# Patient Record
Sex: FEMALE | Race: BLACK OR AFRICAN AMERICAN | Employment: UNEMPLOYED | URBAN - METROPOLITAN AREA
[De-identification: names, ages, dates, MRNs, and addresses within clinical notes are randomized per-mention and may not be internally consistent; named-entity substitution may affect disease eponyms.]

---

## 2017-06-15 ENCOUNTER — APPOINTMENT (OUTPATIENT)
Dept: CT IMAGING | Age: 42
End: 2017-06-15
Attending: EMERGENCY MEDICINE
Payer: MEDICARE

## 2017-06-15 ENCOUNTER — APPOINTMENT (OUTPATIENT)
Dept: GENERAL RADIOLOGY | Age: 42
End: 2017-06-15
Attending: EMERGENCY MEDICINE
Payer: MEDICARE

## 2017-06-15 ENCOUNTER — HOSPITAL ENCOUNTER (EMERGENCY)
Age: 42
Discharge: HOME | End: 2017-06-15
Attending: EMERGENCY MEDICINE
Payer: MEDICARE

## 2017-06-15 VITALS
TEMPERATURE: 97.8 F | OXYGEN SATURATION: 99 % | WEIGHT: 293 LBS | BODY MASS INDEX: 51.91 KG/M2 | DIASTOLIC BLOOD PRESSURE: 72 MMHG | HEIGHT: 63 IN | HEART RATE: 83 BPM | RESPIRATION RATE: 18 BRPM | SYSTOLIC BLOOD PRESSURE: 125 MMHG

## 2017-06-15 DIAGNOSIS — R07.9 CHEST PAIN, UNSPECIFIED TYPE: Primary | ICD-10-CM

## 2017-06-15 LAB
ALBUMIN SERPL BCP-MCNC: 3.3 G/DL (ref 3.5–5)
ALBUMIN/GLOB SERPL: 0.8 {RATIO} (ref 1.2–3.5)
ALP SERPL-CCNC: 96 U/L (ref 50–136)
ALT SERPL-CCNC: 71 U/L (ref 12–65)
ANION GAP BLD CALC-SCNC: 6 MMOL/L (ref 7–16)
AST SERPL W P-5'-P-CCNC: 80 U/L (ref 15–37)
BILIRUB SERPL-MCNC: 0.4 MG/DL (ref 0.2–1.1)
BNP SERPL-MCNC: 4 PG/ML
BUN SERPL-MCNC: 8 MG/DL (ref 6–23)
CALCIUM SERPL-MCNC: 8.8 MG/DL (ref 8.3–10.4)
CHLORIDE SERPL-SCNC: 106 MMOL/L (ref 98–107)
CO2 SERPL-SCNC: 29 MMOL/L (ref 21–32)
CREAT SERPL-MCNC: 0.82 MG/DL (ref 0.6–1)
D DIMER PPP FEU-MCNC: 1.9 UG/ML(FEU)
DIFFERENTIAL METHOD BLD: ABNORMAL
ERYTHROCYTE [DISTWIDTH] IN BLOOD BY AUTOMATED COUNT: 14 % (ref 11.9–14.6)
GLOBULIN SER CALC-MCNC: 4.2 G/DL (ref 2.3–3.5)
GLUCOSE SERPL-MCNC: 83 MG/DL (ref 65–100)
HCT VFR BLD AUTO: 37.9 % (ref 35.8–46.3)
HGB BLD-MCNC: 12.5 G/DL (ref 11.7–15.4)
LIPASE SERPL-CCNC: 219 U/L (ref 73–393)
MCH RBC QN AUTO: 28.7 PG (ref 26.1–32.9)
MCHC RBC AUTO-ENTMCNC: 33 G/DL (ref 31.4–35)
MCV RBC AUTO: 87.1 FL (ref 79.6–97.8)
PLATELET # BLD AUTO: 192 K/UL (ref 150–450)
PLATELET COMMENTS,PCOM: ADEQUATE
PMV BLD AUTO: 10.7 FL (ref 10.8–14.1)
POTASSIUM SERPL-SCNC: 3.9 MMOL/L (ref 3.5–5.1)
PROT SERPL-MCNC: 7.5 G/DL (ref 6.3–8.2)
RBC # BLD AUTO: 4.35 M/UL (ref 4.05–5.25)
RBC MORPH BLD: ABNORMAL
SODIUM SERPL-SCNC: 141 MMOL/L (ref 136–145)
TROPONIN I SERPL-MCNC: <0.04 NG/ML (ref 0.02–0.05)
TROPONIN I SERPL-MCNC: <0.04 NG/ML (ref 0.02–0.05)
WBC # BLD AUTO: 8.3 K/UL (ref 4.3–11.1)
WBC MORPH BLD: ABNORMAL

## 2017-06-15 PROCEDURE — 83690 ASSAY OF LIPASE: CPT | Performed by: EMERGENCY MEDICINE

## 2017-06-15 PROCEDURE — 74011636320 HC RX REV CODE- 636/320: Performed by: EMERGENCY MEDICINE

## 2017-06-15 PROCEDURE — 93005 ELECTROCARDIOGRAM TRACING: CPT | Performed by: EMERGENCY MEDICINE

## 2017-06-15 PROCEDURE — 83880 ASSAY OF NATRIURETIC PEPTIDE: CPT | Performed by: EMERGENCY MEDICINE

## 2017-06-15 PROCEDURE — 84484 ASSAY OF TROPONIN QUANT: CPT | Performed by: EMERGENCY MEDICINE

## 2017-06-15 PROCEDURE — 80053 COMPREHEN METABOLIC PANEL: CPT | Performed by: EMERGENCY MEDICINE

## 2017-06-15 PROCEDURE — 99283 EMERGENCY DEPT VISIT LOW MDM: CPT | Performed by: EMERGENCY MEDICINE

## 2017-06-15 PROCEDURE — 85025 COMPLETE CBC W/AUTO DIFF WBC: CPT | Performed by: EMERGENCY MEDICINE

## 2017-06-15 PROCEDURE — 71260 CT THORAX DX C+: CPT

## 2017-06-15 PROCEDURE — 71020 XR CHEST PA LAT: CPT

## 2017-06-15 PROCEDURE — 74011000258 HC RX REV CODE- 258: Performed by: EMERGENCY MEDICINE

## 2017-06-15 PROCEDURE — 85379 FIBRIN DEGRADATION QUANT: CPT | Performed by: EMERGENCY MEDICINE

## 2017-06-15 RX ORDER — CIPROFLOXACIN 500 MG/1
500 TABLET ORAL 2 TIMES DAILY
Qty: 14 TAB | Refills: 0 | Status: SHIPPED | OUTPATIENT
Start: 2017-06-15 | End: 2017-06-22

## 2017-06-15 RX ORDER — SODIUM CHLORIDE 0.9 % (FLUSH) 0.9 %
10 SYRINGE (ML) INJECTION
Status: COMPLETED | OUTPATIENT
Start: 2017-06-15 | End: 2017-06-15

## 2017-06-15 RX ADMIN — SODIUM CHLORIDE 100 ML: 900 INJECTION, SOLUTION INTRAVENOUS at 20:28

## 2017-06-15 RX ADMIN — Medication 10 ML: at 20:28

## 2017-06-15 RX ADMIN — IOPAMIDOL 100 ML: 755 INJECTION, SOLUTION INTRAVENOUS at 20:28

## 2017-06-16 LAB
ATRIAL RATE: 86 BPM
CALCULATED P AXIS, ECG09: 65 DEGREES
CALCULATED R AXIS, ECG10: 55 DEGREES
CALCULATED T AXIS, ECG11: 2 DEGREES
DIAGNOSIS, 93000: NORMAL
P-R INTERVAL, ECG05: 164 MS
Q-T INTERVAL, ECG07: 354 MS
QRS DURATION, ECG06: 76 MS
QTC CALCULATION (BEZET), ECG08: 423 MS
VENTRICULAR RATE, ECG03: 86 BPM

## 2017-06-16 NOTE — ED PROVIDER NOTES
HPI Comments: Patient is a 42 yo female with chest pain and fatigue. Patient states she has had intermittent pain for about the past 2 weeks and states she has had worsening fatigue during this time and nightsweats. States. Pain is located in the center of her chest, describes the pain as a pressure and last and is constant in nature. Denies any abdominal pain however does endorse some vomiting last night. States SOB with exertion. Patient is a 43 y.o. female presenting with chest pain. The history is provided by the patient. No  was used. Chest Pain (Angina)    Associated symptoms include shortness of breath. Pertinent negatives include no abdominal pain, no back pain, no cough, no fever, no headaches, no nausea, no vomiting and no weakness. Past Medical History:   Diagnosis Date    Hypertension     Psychiatric disorder     anxiety; depression       Past Surgical History:   Procedure Laterality Date    HX GI      gastric bypass    HX ORTHOPAEDIC      lt arm,lt leg,c3 fx         History reviewed. No pertinent family history. Social History     Social History    Marital status: SINGLE     Spouse name: N/A    Number of children: N/A    Years of education: N/A     Occupational History    Not on file. Social History Main Topics    Smoking status: Never Smoker    Smokeless tobacco: Never Used    Alcohol use Yes      Comment: occasional    Drug use: No    Sexual activity: No     Other Topics Concern    Not on file     Social History Narrative         ALLERGIES: Latex; Bactrim [sulfamethoprim]; Hydrocodone-acetaminophen; and Pcn [penicillins]    Review of Systems   Constitutional: Negative for chills and fever. HENT: Negative for rhinorrhea and sore throat. Eyes: Negative for visual disturbance. Respiratory: Positive for shortness of breath. Negative for cough. Cardiovascular: Positive for chest pain. Negative for leg swelling.    Gastrointestinal: Negative for abdominal pain, diarrhea, nausea and vomiting. Genitourinary: Negative for dysuria. Musculoskeletal: Negative for back pain and neck pain. Skin: Negative for rash. Neurological: Negative for weakness and headaches. Psychiatric/Behavioral: The patient is not nervous/anxious. Vitals:    06/15/17 1915   BP: 118/86   Pulse: 86   Resp: 16   Temp: 97.8 °F (36.6 °C)   SpO2: 98%   Weight: (!) 173.7 kg (383 lb)   Height: 5' 3\" (1.6 m)            Physical Exam   Constitutional: She is oriented to person, place, and time. She appears well-developed and well-nourished. HENT:   Head: Normocephalic. Right Ear: External ear normal.   Left Ear: External ear normal.   Eyes: Conjunctivae and EOM are normal. Pupils are equal, round, and reactive to light. Neck: Normal range of motion. Neck supple. No tracheal deviation present. Cardiovascular: Normal rate, regular rhythm, normal heart sounds and intact distal pulses. No murmur heard. Pulmonary/Chest: Effort normal and breath sounds normal. No respiratory distress. Abdominal: Soft. She exhibits no distension. There is no tenderness. There is no rebound. Musculoskeletal: Normal range of motion. Neurological: She is alert and oriented to person, place, and time. No cranial nerve deficit. Skin: No rash noted. Nursing note and vitals reviewed.        MDM  Number of Diagnoses or Management Options  Chest pain, unspecified type: new and requires workup     Amount and/or Complexity of Data Reviewed  Clinical lab tests: ordered and reviewed  Tests in the radiology section of CPT®: ordered and reviewed  Tests in the medicine section of CPT®: ordered and reviewed  Review and summarize past medical records: yes    Risk of Complications, Morbidity, and/or Mortality  Presenting problems: high  Diagnostic procedures: high  Management options: high    Patient Progress  Patient progress: stable    ED Course       Procedures    Recent Results (from the past 12 hour(s))   EKG, 12 LEAD, INITIAL    Collection Time: 06/15/17  7:04 PM   Result Value Ref Range    Ventricular Rate 86 BPM    Atrial Rate 86 BPM    P-R Interval 164 ms    QRS Duration 76 ms    Q-T Interval 354 ms    QTC Calculation (Bezet) 423 ms    Calculated P Axis 65 degrees    Calculated R Axis 55 degrees    Calculated T Axis 2 degrees    Diagnosis       Normal sinus rhythm  Normal ECG  No previous ECGs available     CBC WITH AUTOMATED DIFF    Collection Time: 06/15/17  7:14 PM   Result Value Ref Range    WBC 8.3 4.3 - 11.1 K/uL    RBC 4.35 4.05 - 5.25 M/uL    HGB 12.5 11.7 - 15.4 g/dL    HCT 37.9 35.8 - 46.3 %    MCV 87.1 79.6 - 97.8 FL    MCH 28.7 26.1 - 32.9 PG    MCHC 33.0 31.4 - 35.0 g/dL    RDW 14.0 11.9 - 14.6 %    PLATELET 478 789 - 488 K/uL    MPV 10.7 (L) 10.8 - 14.1 FL    RBC COMMENTS NORMOCYTIC/NORMOCHROMIC      WBC COMMENTS Result Confirmed By Smear      PLATELET COMMENTS ADEQUATE      DF MANUAL     METABOLIC PANEL, COMPREHENSIVE    Collection Time: 06/15/17  7:14 PM   Result Value Ref Range    Sodium 141 136 - 145 mmol/L    Potassium 3.9 3.5 - 5.1 mmol/L    Chloride 106 98 - 107 mmol/L    CO2 29 21 - 32 mmol/L    Anion gap 6 (L) 7 - 16 mmol/L    Glucose 83 65 - 100 mg/dL    BUN 8 6 - 23 MG/DL    Creatinine 0.82 0.6 - 1.0 MG/DL    GFR est AA >60 >60 ml/min/1.73m2    GFR est non-AA >60 >60 ml/min/1.73m2    Calcium 8.8 8.3 - 10.4 MG/DL    Bilirubin, total 0.4 0.2 - 1.1 MG/DL    ALT (SGPT) 71 (H) 12 - 65 U/L    AST (SGOT) 80 (H) 15 - 37 U/L    Alk.  phosphatase 96 50 - 136 U/L    Protein, total 7.5 6.3 - 8.2 g/dL    Albumin 3.3 (L) 3.5 - 5.0 g/dL    Globulin 4.2 (H) 2.3 - 3.5 g/dL    A-G Ratio 0.8 (L) 1.2 - 3.5     TROPONIN I    Collection Time: 06/15/17  7:14 PM   Result Value Ref Range    Troponin-I, Qt. <0.04 0.02 - 0.05 NG/ML   LIPASE    Collection Time: 06/15/17  7:14 PM   Result Value Ref Range    Lipase 219 73 - 393 U/L     Xr Chest Pa Lat    Result Date: 6/15/2017  PA and lateral chest radiographs History: chest pain, 42 years Female moderate cp with shob x 2 weeks Comparison: None available Findings:  Normal cardiomediastinal silhouette. Nonspecific mild diffuse interstitial prominence, however in the appropriate clinical setting an acute atypical pneumonia such as mycoplasma or influenza could be considered. Mild dependent subsegmental atelectasis bilateral lung bases. No evidence of pneumothorax, pleural effusion, or air space opacity. Visualized soft tissue and osseous structures otherwise unremarkable. Impression:  Nonspecific mild diffuse interstitial prominence, which in the appropriate clinical setting could represent an acute atypical pneumonia. Xr Chest Pa Lat    Result Date: 6/15/2017  PA and lateral chest radiographs History: chest pain, 42 years Female moderate cp with shob x 2 weeks Comparison: None available Findings:  Normal cardiomediastinal silhouette. Nonspecific mild diffuse interstitial prominence, however in the appropriate clinical setting an acute atypical pneumonia such as mycoplasma or influenza could be considered. Mild dependent subsegmental atelectasis bilateral lung bases. No evidence of pneumothorax, pleural effusion, or air space opacity. Visualized soft tissue and osseous structures otherwise unremarkable. Impression:  Nonspecific mild diffuse interstitial prominence, which in the appropriate clinical setting could represent an acute atypical pneumonia. Ct Chest W Cont    Result Date: 6/15/2017  CT THORAX WITH CONTRAST HISTORY: PE protocol- also central chest mass w/ SOB, 42 years Female chest pain started a few weeks ago, progressively worsening, hot flashes, dizzy, shortness of breath COMPARISON: None available TECHNIQUE: 100 cc of nonionic contrast injected, and axial helical images from the thoracic inlet through diaphragm were obtained.   Radiation dose reduction techniques were used for this study:  Our CT scanners use one or all of the following: Automated exposure control, adjustment of the mA and/or kVp according to patient's size, iterative reconstruction. FINDINGS: Partially visualized thyroid unremarkable. No evidence of significant mediastinal, hilar, or axillary lymphadenopathy. Mild calcific atherosclerosis of a normal caliber aortic arch and descending aorta. No evidence of filling defect in the pulmonary artery or its lobar or segmental branches. No evidence of pulmonary embolus. Mild dependent subsegmental atelectasis bilateral lung bases. No evidence of pleural effusion or air space consolidation. Visualized proximal airways unremarkable. Evidence of gastric bypass surgery. Right renal upper pole cortical scarring. Visualized upper abdominal viscera including the adrenal glands are otherwise unremarkable. Visualized osseous structures unremarkable. IMPRESSION: No acute pathology identified. No evidence of pulmonary embolus. Other incidental findings as above. Patient is a 44 yo female with chest pain and SOB:    Patient very well appearing, smiling, NAD. Delta troponin negative, EKG without concern for acurte process, discussed with Dr. Destin Davis who agrees with plan for immediate follow up and cardiology overnight follow up line called and he states he will notify scheduling as well. Patient agrees to return with any further chest pain, any SOB or any further concerns. Imaging as above with only possible explanation as atypical pneumonia. Patient placed on cipro for possible pneumonia and will call first thing tomorrow for appointment tomorrow or first thing Monday.

## 2018-01-14 LAB
FLUAV AG NPH QL IA: NEGATIVE
FLUBV AG NPH QL IA: NEGATIVE

## 2018-01-14 PROCEDURE — 87804 INFLUENZA ASSAY W/OPTIC: CPT | Performed by: EMERGENCY MEDICINE

## 2018-01-14 PROCEDURE — 99282 EMERGENCY DEPT VISIT SF MDM: CPT | Performed by: EMERGENCY MEDICINE

## 2018-01-15 ENCOUNTER — HOSPITAL ENCOUNTER (EMERGENCY)
Age: 43
Discharge: HOME | End: 2018-01-15
Attending: EMERGENCY MEDICINE
Payer: MEDICARE

## 2018-01-15 VITALS
SYSTOLIC BLOOD PRESSURE: 164 MMHG | TEMPERATURE: 98.9 F | BODY MASS INDEX: 51.91 KG/M2 | OXYGEN SATURATION: 98 % | RESPIRATION RATE: 20 BRPM | HEIGHT: 63 IN | WEIGHT: 293 LBS | HEART RATE: 83 BPM | DIASTOLIC BLOOD PRESSURE: 94 MMHG

## 2018-01-15 DIAGNOSIS — J06.9 ACUTE UPPER RESPIRATORY INFECTION: Primary | ICD-10-CM

## 2018-01-15 RX ORDER — BENZONATATE 100 MG/1
200 CAPSULE ORAL
Qty: 30 CAP | Refills: 1 | Status: SHIPPED | OUTPATIENT
Start: 2018-01-15 | End: 2018-01-22

## 2018-01-15 NOTE — DISCHARGE INSTRUCTIONS
Upper Respiratory Infection (Cold): Care Instructions  Your Care Instructions    An upper respiratory infection, or URI, is an infection of the nose, sinuses, or throat. URIs are spread by coughs, sneezes, and direct contact. The common cold is the most frequent kind of URI. The flu and sinus infections are other kinds of URIs. Almost all URIs are caused by viruses. Antibiotics won't cure them. But you can treat most infections with home care. This may include drinking lots of fluids and taking over-the-counter pain medicine. You will probably feel better in 4 to 10 days. The doctor has checked you carefully, but problems can develop later. If you notice any problems or new symptoms, get medical treatment right away. Follow-up care is a key part of your treatment and safety. Be sure to make and go to all appointments, and call your doctor if you are having problems. It's also a good idea to know your test results and keep a list of the medicines you take. How can you care for yourself at home? · To prevent dehydration, drink plenty of fluids, enough so that your urine is light yellow or clear like water. Choose water and other caffeine-free clear liquids until you feel better. If you have kidney, heart, or liver disease and have to limit fluids, talk with your doctor before you increase the amount of fluids you drink. · Take an over-the-counter pain medicine, such as acetaminophen (Tylenol), ibuprofen (Advil, Motrin), or naproxen (Aleve). Read and follow all instructions on the label. · Before you use cough and cold medicines, check the label. These medicines may not be safe for young children or for people with certain health problems. · Be careful when taking over-the-counter cold or flu medicines and Tylenol at the same time. Many of these medicines have acetaminophen, which is Tylenol. Read the labels to make sure that you are not taking more than the recommended dose.  Too much acetaminophen (Tylenol) can be harmful. · Get plenty of rest.  · Do not smoke or allow others to smoke around you. If you need help quitting, talk to your doctor about stop-smoking programs and medicines. These can increase your chances of quitting for good. When should you call for help? Call 911 anytime you think you may need emergency care. For example, call if:  ? · You have severe trouble breathing. ?Call your doctor now or seek immediate medical care if:  ? · You seem to be getting much sicker. ? · You have new or worse trouble breathing. ? · You have a new or higher fever. ? · You have a new rash. ? Watch closely for changes in your health, and be sure to contact your doctor if:  ? · You have a new symptom, such as a sore throat, an earache, or sinus pain. ? · You cough more deeply or more often, especially if you notice more mucus or a change in the color of your mucus. ? · You do not get better as expected. Where can you learn more? Go to http://naz-shelly.info/. Enter K465 in the search box to learn more about \"Upper Respiratory Infection (Cold): Care Instructions. \"  Current as of: May 12, 2017  Content Version: 11.4  © 6544-7841 [employer]. Care instructions adapted under license by [employer] (which disclaims liability or warranty for this information). If you have questions about a medical condition or this instruction, always ask your healthcare professional. [place] [address] any warranty or liability for your use of this information.

## 2018-01-15 NOTE — ED NOTES
I have reviewed discharge instructions with the patient. The patient verbalized understanding. Patient left ED via Discharge Method: ambulatory to Home). Opportunity for questions and clarification provided. Patient given 2 scripts. To continue your aftercare when you leave the hospital, you may receive an automated call from our care team to check in on how you are doing. This is a free service and part of our promise to provide the best care and service to meet your aftercare needs.  If you have questions, or wish to unsubscribe from this service please call [phone]. Thank you for Choosing our [facility].

## 2018-01-15 NOTE — ED PROVIDER NOTES
HPI Comments: Patient states she has been having severe body aches and sinus congestion and a cough for the past 2 days. Her symptoms have gotten progressively worse. She also has been having a sore throat. She has had a mild low-grade fever, denies any vomiting or diarrhea. She has been eating and drinking well without any difficulty. She denies any known sick contacts, has not taken any medicine for her symptoms. Elements of this note were created using speech recognition software. As such, errors of speech recognition may be present. Patient is a 43 y.o. female presenting with cough and fever. The history is provided by the patient. Cough   Associated symptoms include chills. Pertinent negatives include no nausea and no vomiting. Fever    Associated symptoms include cough. Pertinent negatives include no diarrhea and no vomiting. Past Medical History:   Diagnosis Date    Diabetes ([facility] [address]     Hypertension     Psychiatric disorder     anxiety; depression       Past Surgical History:   Procedure Laterality Date    HX GI      gastric bypass    HX ORTHOPAEDIC      lt arm,lt leg,c3 fx         No family history on file. Social History     Social History    Marital status: SINGLE     Spouse name: N/A    Number of children: N/A    Years of education: N/A     Occupational History    Not on file. Social History Main Topics    Smoking status: Never Smoker    Smokeless tobacco: Never Used    Alcohol use Yes      Comment: occasional    Drug use: No    Sexual activity: No     Other Topics Concern    Not on file     Social History Narrative         ALLERGIES: Latex; Bactrim [sulfamethoprim]; Hydrocodone-acetaminophen; and Pcn [penicillins]    Review of Systems   Constitutional: Positive for chills and fever. Respiratory: Positive for cough. Gastrointestinal: Negative for diarrhea, nausea and vomiting. All other systems reviewed and are negative.       Vitals:    01/14/18 2208 01/15/18 0050   BP: (!) 164/94    Pulse: 83    Resp: 20    Temp: 98.9 °F (37.2 °C)    SpO2: 99% 98%   Weight: (!) 171 kg (377 lb)    Height: 5' 3\" (1.6 m)             Physical Exam   Constitutional: She is oriented to person, place, and time. She appears well-developed and well-nourished. HENT:   Head: Normocephalic and atraumatic. Eyes: Conjunctivae are normal. Pupils are equal, round, and reactive to light. Neck: Normal range of motion. Neck supple. Cardiovascular: Normal rate and regular rhythm. Pulmonary/Chest: Effort normal and breath sounds normal.   Musculoskeletal: She exhibits no edema or tenderness. Neurological: She is alert and oriented to person, place, and time. Skin: Skin is warm and dry. Psychiatric: She has a normal mood and affect. Her behavior is normal.   Nursing note and vitals reviewed.        MDM  Number of Diagnoses or Management Options  Acute upper respiratory infection: new and does not require workup  Diagnosis management comments: differential diagnosis: vital syndrome, strep throat, influenza, pneumonia  1:10 AM discussed results with patient, need for symptomatic relief       Amount and/or Complexity of Data Reviewed  Clinical lab tests: ordered and reviewed    Risk of Complications, Morbidity, and/or Mortality  Presenting problems: moderate  Diagnostic procedures: moderate  Management options: moderate    Patient Progress  Patient progress: stable    ED Course       Procedures

## 2018-05-07 ENCOUNTER — HOSPITAL ENCOUNTER (EMERGENCY)
Age: 43
Discharge: HOME | End: 2018-05-07
Attending: EMERGENCY MEDICINE
Payer: MEDICARE

## 2018-05-07 VITALS
HEART RATE: 73 BPM | RESPIRATION RATE: 18 BRPM | OXYGEN SATURATION: 99 % | DIASTOLIC BLOOD PRESSURE: 60 MMHG | TEMPERATURE: 98.5 F | SYSTOLIC BLOOD PRESSURE: 121 MMHG

## 2018-05-07 DIAGNOSIS — H92.01 OTALGIA OF RIGHT EAR: ICD-10-CM

## 2018-05-07 DIAGNOSIS — R30.0 DYSURIA: Primary | ICD-10-CM

## 2018-05-07 LAB
BACTERIA URNS QL MICRO: 0 /HPF
CASTS URNS QL MICRO: NORMAL /LPF
EPI CELLS #/AREA URNS HPF: NORMAL /HPF
HCG UR QL: NEGATIVE
RBC #/AREA URNS HPF: NORMAL /HPF
WBC URNS QL MICRO: NORMAL /HPF

## 2018-05-07 PROCEDURE — 81025 URINE PREGNANCY TEST: CPT

## 2018-05-07 PROCEDURE — 99284 EMERGENCY DEPT VISIT MOD MDM: CPT | Performed by: EMERGENCY MEDICINE

## 2018-05-07 PROCEDURE — 74011250637 HC RX REV CODE- 250/637: Performed by: EMERGENCY MEDICINE

## 2018-05-07 PROCEDURE — 81003 URINALYSIS AUTO W/O SCOPE: CPT | Performed by: EMERGENCY MEDICINE

## 2018-05-07 PROCEDURE — 81015 MICROSCOPIC EXAM OF URINE: CPT | Performed by: EMERGENCY MEDICINE

## 2018-05-07 RX ORDER — TRAZODONE HYDROCHLORIDE 50 MG/1
100 TABLET ORAL
COMMUNITY

## 2018-05-07 RX ORDER — UREA 10 %
800 LOTION (ML) TOPICAL DAILY
COMMUNITY

## 2018-05-07 RX ORDER — VENLAFAXINE HYDROCHLORIDE 75 MG/1
225 CAPSULE, EXTENDED RELEASE ORAL DAILY
COMMUNITY
End: 2019-09-25 | Stop reason: CLARIF

## 2018-05-07 RX ORDER — PHENAZOPYRIDINE HYDROCHLORIDE 95 MG/1
200 TABLET ORAL
Status: COMPLETED | OUTPATIENT
Start: 2018-05-07 | End: 2018-05-07

## 2018-05-07 RX ORDER — PREDNISOLONE ACETATE 10 MG/ML
1 SUSPENSION/ DROPS OPHTHALMIC 2 TIMES DAILY
COMMUNITY
End: 2018-06-01 | Stop reason: CLARIF

## 2018-05-07 RX ORDER — NITROFURANTOIN 25; 75 MG/1; MG/1
100 CAPSULE ORAL 2 TIMES DAILY
Qty: 14 CAP | Refills: 0 | Status: SHIPPED | OUTPATIENT
Start: 2018-05-07 | End: 2018-05-14

## 2018-05-07 RX ORDER — NITROFURANTOIN MACROCRYSTALS 50 MG/1
100 CAPSULE ORAL
Status: COMPLETED | OUTPATIENT
Start: 2018-05-07 | End: 2018-05-07

## 2018-05-07 RX ORDER — PHENAZOPYRIDINE HYDROCHLORIDE 200 MG/1
200 TABLET, FILM COATED ORAL 3 TIMES DAILY
Qty: 6 TAB | Refills: 0 | Status: SHIPPED | OUTPATIENT
Start: 2018-05-07 | End: 2018-05-09

## 2018-05-07 RX ORDER — VENLAFAXINE HYDROCHLORIDE 150 MG/1
225 CAPSULE, EXTENDED RELEASE ORAL DAILY
COMMUNITY
End: 2019-09-25 | Stop reason: CLARIF

## 2018-05-07 RX ADMIN — NITROFURANTOIN MACROCRYSTALS 100 MG: 50 CAPSULE ORAL at 23:08

## 2018-05-07 RX ADMIN — URINARY PAIN RELIEF 190 MG: 95 TABLET ORAL at 23:07

## 2018-05-08 NOTE — ED PROVIDER NOTES
HPI Comments: Here with some right ear pain x several days. No fever. No injury. Some pain just before voiding/ during and tapers after. no change in urine odor or frequency. Hand and thumb issue is long-standing and followed by ortho and essentially unchanged    Patient is a 43 y.o. female presenting with ear pain and urinary pain. The history is provided by the patient. Ear Pain    This is a new problem. The current episode started more than 2 days ago. Patient complains that the right ear is affected. There has been no fever. The pain is mild. Associated symptoms include headaches. Pertinent negatives include no ear discharge, no hearing loss, no rhinorrhea, no sore throat, no diarrhea, no vomiting, no cough and no rash. Urinary Pain    Pertinent negatives include no chills and no vomiting. Past Medical History:   Diagnosis Date    Diabetes ([address]     Hypertension     Psychiatric disorder     anxiety; depression       Past Surgical History:   Procedure Laterality Date    HX GI      gastric bypass    HX ORTHOPAEDIC      lt arm,lt leg,c3 fx         History reviewed. No pertinent family history. Social History     Social History    Marital status: SINGLE     Spouse name: N/A    Number of children: N/A    Years of education: N/A     Occupational History    Not on file. Social History Main Topics    Smoking status: Never Smoker    Smokeless tobacco: Never Used    Alcohol use 1.2 oz/week     1 Glasses of wine, 1 Cans of beer per week      Comment: occasional    Drug use: 1.00 per week     Special: Marijuana    Sexual activity: Yes     Birth control/ protection: None     Other Topics Concern    Not on file     Social History Narrative         ALLERGIES: Latex; Bactrim [sulfamethoprim]; Hydrocodone-acetaminophen; and Pcn [penicillins]    Review of Systems   Constitutional: Negative for chills. HENT: Positive for ear pain.  Negative for ear discharge, hearing loss, rhinorrhea and sore throat. Respiratory: Negative for cough. Gastrointestinal: Negative for diarrhea and vomiting. Skin: Negative for rash. Neurological: Positive for headaches. All other systems reviewed and are negative. Vitals:    05/07/18 1917 05/07/18 2304 05/07/18 2306 05/07/18 2314   BP: 148/74 121/60     Pulse: 79  73    Resp: 18      Temp: 97.9 °F (36.6 °C)   98.5 °F (36.9 °C)   SpO2: 100%  99%             Physical Exam   Constitutional: She appears well-developed and well-nourished. No distress. HENT:   Head: Atraumatic. Right Ear: Tympanic membrane, external ear and ear canal normal. No mastoid tenderness. Left Ear: Tympanic membrane, external ear and ear canal normal. No mastoid tenderness. Nose: Nose normal.   Eyes: No scleral icterus. Neck: Neck supple. Cardiovascular: Normal rate. Pulmonary/Chest: Effort normal. No respiratory distress. She has no wheezes. Abdominal: Soft. There is no tenderness. There is no rebound. Musculoskeletal: Normal range of motion. She exhibits no edema or deformity. Neurological: She is alert. She exhibits normal muscle tone. Coordination normal.   Skin: Skin is warm and dry. Psychiatric: Her behavior is normal. Thought content normal.   Nursing note and vitals reviewed. MDM  Number of Diagnoses or Management Options  Dysuria:   Otalgia of right ear:   Diagnosis management comments: Exam quite benign. Will cover possible though not defined lower UTI. No GYN issue.  Hand with no acute issue       Amount and/or Complexity of Data Reviewed  Clinical lab tests: reviewed and ordered  Decide to obtain previous medical records or to obtain history from someone other than the patient: yes    Risk of Complications, Morbidity, and/or Mortality  Presenting problems: moderate  Diagnostic procedures: minimal  Management options: low    Patient Progress  Patient progress: stable        ED Course       Procedures

## 2018-05-08 NOTE — DISCHARGE INSTRUCTIONS
Painful Urination (Dysuria): Care Instructions  Your Care Instructions  Burning pain with urination (dysuria) is a common symptom of a urinary tract infection or other urinary problems. The bladder may become inflamed. This can cause pain when the bladder fills and empties. You may also feel pain if the tube that carries urine from the bladder to the outside of the body (urethra) gets irritated or infected. Sexually transmitted infections (STIs) also may cause pain when you urinate. Sometimes the pain can be caused by things other than an infection. The urethra can be irritated by soaps, perfumes, or foreign objects in the urethra. Kidney stones can cause pain when they pass through the urethra. The cause may be hard to find. You may need tests. Treatment for painful urination depends on the cause. Follow-up care is a key part of your treatment and safety. Be sure to make and go to all appointments, and call your doctor if you are having problems. It's also a good idea to know your test results and keep a list of the medicines you take. How can you care for yourself at home? · Drink extra water for the next day or two. This will help make the urine less concentrated. (If you have kidney, heart, or liver disease and have to limit fluids, talk with your doctor before you increase the amount of fluids you drink.)  · Avoid drinks that are carbonated or have caffeine. They can irritate the bladder. · Urinate often. Try to empty your bladder each time. For women:  · Urinate right after you have sex. · After going to the bathroom, wipe from front to back. · Avoid douches, bubble baths, and feminine hygiene sprays. And avoid other feminine hygiene products that have deodorants. When should you call for help? Call your doctor now or seek immediate medical care if:  ? · You have new symptoms, such as fever, nausea, or vomiting. ? · You have new or worse symptoms of a urinary problem.  For example:  Collette Shine have blood or pus in your urine. ¨ You have chills or body aches. ¨ It hurts worse to urinate. ¨ You have groin or belly pain. ¨ You have pain in your back just below your rib cage (the flank area). ? Watch closely for changes in your health, and be sure to contact your doctor if you have any problems. Where can you learn more? Go to http://naz-shelly.info/. Enter M743 in the search box to learn more about \"Painful Urination (Dysuria): Care Instructions. \"  Current as of: May 12, 2017  Content Version: 11.4  © 7930-7512 [employer]. Care instructions adapted under license by [employer] (which disclaims liability or warranty for this information). If you have questions about a medical condition or this instruction, always ask your healthcare professional. [address] any warranty or liability for your use of this information. Earache: Care Instructions  Your Care Instructions    Even though infection is a common cause of ear pain, not all ear pain means an infection. If you have ear pain and don't have an infection, it could be because of a jaw problem, such as temporomandibular joint (TMJ) pain. Or it could be because of a neck problem. When ear discomfort or pain is mild or comes and goes without other symptoms, home treatment may be all you need. Follow-up care is a key part of your treatment and safety. Be sure to make and go to all appointments, and call your doctor if you are having problems. It's also a good idea to know your test results and keep a list of the medicines you take. How can you care for yourself at home? · Apply heat on the ear to ease pain. To apply heat, put a warm water bottle, a heating pad set on low, or a warm cloth on your ear. Do not go to sleep with a heating pad on your skin. · Take an over-the-counter pain medicine, such as acetaminophen (Tylenol), ibuprofen (Advil, Motrin), or naproxen (Aleve). Be safe with medicines. Read and follow all instructions on the label. · Do not take two or more pain medicines at the same time unless the doctor told you to. Many pain medicines have acetaminophen, which is Tylenol. Too much acetaminophen (Tylenol) can be harmful. · Never insert anything, such as a cotton swab or a camelia pin, into the ear. When should you call for help? Call your doctor now or seek immediate medical care if:  ? · You have new or worse symptoms of infection, such as:  ¨ Increased pain, swelling, warmth, or redness. ¨ Red streaks leading from the area. ¨ Pus draining from the area. ¨ A fever. ? Watch closely for changes in your health, and be sure to contact your doctor if:  ? · You have new or worse discharge coming from the ear. ? · You do not get better as expected. Where can you learn more? Go to http://naz-shelly.info/. Enter W423 in the search box to learn more about \"Earache: Care Instructions. \"  Current as of: May 12, 2017  Content Version: 11.4  © 4186-8206 [employer]. Care instructions adapted under license by [email or web address] (which disclaims liability or warranty for this information). If you have questions about a medical condition or this instruction, always ask your healthcare professional. [address] any warranty or liability for your use of this information.

## 2018-05-08 NOTE — ED NOTES
I have reviewed discharge instructions with the patient. The patient verbalized understanding. Patient left ED via Discharge Method: ambulatory to Home with (self). Opportunity for questions and clarification provided. Patient given 2 scripts. To continue your aftercare when you leave the hospital, you may receive an automated call from our care team to check in on how you are doing. This is a free service and part of our promise to provide the best care and service to meet your aftercare needs.  If you have questions, or wish to unsubscribe from this service please call [phone]. Thank you for Choosing our [address] [place] [address] Emergency Department.

## 2018-05-08 NOTE — ED NOTES
Pt states pain in right ear and headache on right side x4 days.  pts states pain to lower back and urinary frequency/burning, pt also states to right thumb

## 2018-05-14 ENCOUNTER — HOSPITAL ENCOUNTER (EMERGENCY)
Age: 43
Discharge: HOME | End: 2018-05-14
Attending: EMERGENCY MEDICINE
Payer: MEDICARE

## 2018-05-14 ENCOUNTER — APPOINTMENT (OUTPATIENT)
Dept: ULTRASOUND IMAGING | Age: 43
End: 2018-05-14
Attending: EMERGENCY MEDICINE
Payer: MEDICARE

## 2018-05-14 VITALS
RESPIRATION RATE: 16 BRPM | DIASTOLIC BLOOD PRESSURE: 79 MMHG | HEIGHT: 63 IN | WEIGHT: 293 LBS | TEMPERATURE: 98.1 F | SYSTOLIC BLOOD PRESSURE: 145 MMHG | HEART RATE: 80 BPM | OXYGEN SATURATION: 99 % | BODY MASS INDEX: 51.91 KG/M2

## 2018-05-14 DIAGNOSIS — N83.202 CYSTS OF BOTH OVARIES: Primary | ICD-10-CM

## 2018-05-14 DIAGNOSIS — N83.201 CYSTS OF BOTH OVARIES: Primary | ICD-10-CM

## 2018-05-14 LAB
ALBUMIN SERPL-MCNC: 2.8 G/DL (ref 3.5–5)
ALBUMIN/GLOB SERPL: 0.6 {RATIO} (ref 1.2–3.5)
ALP SERPL-CCNC: 83 U/L (ref 50–136)
ALT SERPL-CCNC: 21 U/L (ref 12–65)
ANION GAP SERPL CALC-SCNC: 9 MMOL/L (ref 7–16)
AST SERPL-CCNC: 17 U/L (ref 15–37)
BACTERIA URNS QL MICRO: 0 /HPF
BILIRUB SERPL-MCNC: 0.4 MG/DL (ref 0.2–1.1)
BUN SERPL-MCNC: 7 MG/DL (ref 6–23)
CALCIUM SERPL-MCNC: 8.3 MG/DL (ref 8.3–10.4)
CASTS URNS QL MICRO: NORMAL /LPF
CHLORIDE SERPL-SCNC: 106 MMOL/L (ref 98–107)
CO2 SERPL-SCNC: 27 MMOL/L (ref 21–32)
CREAT SERPL-MCNC: 0.91 MG/DL (ref 0.6–1)
DIFFERENTIAL METHOD BLD: ABNORMAL
EOSINOPHIL # BLD: 0.2 K/UL (ref 0–0.8)
EOSINOPHIL NFR BLD MANUAL: 1 % (ref 1–8)
EPI CELLS #/AREA URNS HPF: NORMAL /HPF
ERYTHROCYTE [DISTWIDTH] IN BLOOD BY AUTOMATED COUNT: 14.3 % (ref 11.9–14.6)
GLOBULIN SER CALC-MCNC: 4.4 G/DL (ref 2.3–3.5)
GLUCOSE SERPL-MCNC: 99 MG/DL (ref 65–100)
HCG UR QL: NEGATIVE
HCT VFR BLD AUTO: 34.2 % (ref 35.8–46.3)
HGB BLD-MCNC: 11.4 G/DL (ref 11.7–15.4)
LACTATE BLD-SCNC: 0.4 MMOL/L (ref 0.5–1.9)
LYMPHOCYTES # BLD: 3.2 K/UL (ref 0.5–4.6)
LYMPHOCYTES NFR BLD MANUAL: 20 % (ref 16–44)
MCH RBC QN AUTO: 28.9 PG (ref 26.1–32.9)
MCHC RBC AUTO-ENTMCNC: 33.3 G/DL (ref 31.4–35)
MCV RBC AUTO: 86.8 FL (ref 79.6–97.8)
MONOCYTES # BLD: 0.8 K/UL (ref 0.1–1.3)
MONOCYTES NFR BLD MANUAL: 5 % (ref 3–9)
NEUTS SEG # BLD: 11.8 K/UL (ref 1.7–8.2)
NEUTS SEG NFR BLD MANUAL: 74 % (ref 47–75)
PLATELET # BLD AUTO: 257 K/UL (ref 150–450)
PLATELET COMMENTS,PCOM: ADEQUATE
PMV BLD AUTO: 8.9 FL (ref 10.8–14.1)
POTASSIUM SERPL-SCNC: 2.9 MMOL/L (ref 3.5–5.1)
PROT SERPL-MCNC: 7.2 G/DL (ref 6.3–8.2)
RBC # BLD AUTO: 3.94 M/UL (ref 4.05–5.25)
RBC #/AREA URNS HPF: NORMAL /HPF
RBC MORPH BLD: ABNORMAL
SERVICE CMNT-IMP: NORMAL
SODIUM SERPL-SCNC: 142 MMOL/L (ref 136–145)
WBC # BLD AUTO: 16 K/UL (ref 4.3–11.1)
WBC MORPH BLD: ABNORMAL
WBC URNS QL MICRO: NORMAL /HPF
WET PREP GENITAL: NORMAL
WET PREP GENITAL: NORMAL

## 2018-05-14 PROCEDURE — 99285 EMERGENCY DEPT VISIT HI MDM: CPT | Performed by: EMERGENCY MEDICINE

## 2018-05-14 PROCEDURE — 96361 HYDRATE IV INFUSION ADD-ON: CPT | Performed by: EMERGENCY MEDICINE

## 2018-05-14 PROCEDURE — 76856 US EXAM PELVIC COMPLETE: CPT

## 2018-05-14 PROCEDURE — 81003 URINALYSIS AUTO W/O SCOPE: CPT | Performed by: EMERGENCY MEDICINE

## 2018-05-14 PROCEDURE — 87491 CHLMYD TRACH DNA AMP PROBE: CPT | Performed by: EMERGENCY MEDICINE

## 2018-05-14 PROCEDURE — 83605 ASSAY OF LACTIC ACID: CPT

## 2018-05-14 PROCEDURE — 96374 THER/PROPH/DIAG INJ IV PUSH: CPT | Performed by: EMERGENCY MEDICINE

## 2018-05-14 PROCEDURE — 80053 COMPREHEN METABOLIC PANEL: CPT | Performed by: EMERGENCY MEDICINE

## 2018-05-14 PROCEDURE — 81025 URINE PREGNANCY TEST: CPT

## 2018-05-14 PROCEDURE — 81015 MICROSCOPIC EXAM OF URINE: CPT | Performed by: EMERGENCY MEDICINE

## 2018-05-14 PROCEDURE — 74011250636 HC RX REV CODE- 250/636: Performed by: EMERGENCY MEDICINE

## 2018-05-14 PROCEDURE — 87210 SMEAR WET MOUNT SALINE/INK: CPT | Performed by: EMERGENCY MEDICINE

## 2018-05-14 PROCEDURE — 85025 COMPLETE CBC W/AUTO DIFF WBC: CPT | Performed by: EMERGENCY MEDICINE

## 2018-05-14 RX ORDER — SODIUM CHLORIDE 0.9 % (FLUSH) 0.9 %
5-10 SYRINGE (ML) INJECTION AS NEEDED
Status: DISCONTINUED | OUTPATIENT
Start: 2018-05-14 | End: 2018-05-15 | Stop reason: HOSPADM

## 2018-05-14 RX ORDER — KETOROLAC TROMETHAMINE 30 MG/ML
30 INJECTION, SOLUTION INTRAMUSCULAR; INTRAVENOUS
Status: COMPLETED | OUTPATIENT
Start: 2018-05-14 | End: 2018-05-14

## 2018-05-14 RX ORDER — TRAMADOL HYDROCHLORIDE 50 MG/1
50 TABLET ORAL
Qty: 20 TAB | Refills: 0 | Status: SHIPPED | OUTPATIENT
Start: 2018-05-14

## 2018-05-14 RX ORDER — ONDANSETRON 2 MG/ML
4 INJECTION INTRAMUSCULAR; INTRAVENOUS
Status: DISCONTINUED | OUTPATIENT
Start: 2018-05-14 | End: 2018-05-15 | Stop reason: HOSPADM

## 2018-05-14 RX ADMIN — KETOROLAC TROMETHAMINE 30 MG: 30 INJECTION, SOLUTION INTRAMUSCULAR at 20:30

## 2018-05-14 RX ADMIN — SODIUM CHLORIDE 1000 ML: 900 INJECTION, SOLUTION INTRAVENOUS at 20:20

## 2018-05-14 NOTE — ED TRIAGE NOTES
PMD-None. Pt c/o urinary and back pain and nausea continuing since being seen here on 5/7/18. She states that she started the antibiotics but within a few days her discomfort was worse.

## 2018-05-15 NOTE — ED PROVIDER NOTES
HPI Comments: Patient with a history of gastric bypass. Was seen here one week ago with ear pain and dysuria. Diagnosed with possible UTI and started on Macrobid. Patient some subtle some small improvement in pain but then worsened again a couple days ago. Has lower abdominal pain achy sharp in nature. Mild nausea. No vomiting. No diarrhea or constipation. Denies any vaginal bleeding or discharge. Patient is a 43 y.o. female presenting with abdominal pain. The history is provided by the patient. No  was used. Abdominal Pain    This is a new problem. The current episode started more than 1 week ago. The problem occurs constantly. The problem has been gradually worsening. The pain is associated with an unknown factor. The pain is located in the suprapubic region. The quality of the pain is aching. The pain is moderate. Associated symptoms include nausea and dysuria. Pertinent negatives include no fever, no diarrhea, no melena, no vomiting, no constipation, no hematuria, no headaches, no chest pain and no back pain. The pain is worsened by palpation. The pain is relieved by nothing. Her past medical history is significant for DM. Past Medical History:   Diagnosis Date    Diabetes ([facility] [address]     Hypertension     Psychiatric disorder     anxiety; depression       Past Surgical History:   Procedure Laterality Date    HX GI      gastric bypass    HX ORTHOPAEDIC      lt arm,lt leg,c3 fx         History reviewed. No pertinent family history. Social History     Social History    Marital status: SINGLE     Spouse name: N/A    Number of children: N/A    Years of education: N/A     Occupational History    Not on file.      Social History Main Topics    Smoking status: Never Smoker    Smokeless tobacco: Never Used    Alcohol use 1.2 oz/week     1 Glasses of wine, 1 Cans of beer per week      Comment: occasional    Drug use: 1.00 per week     Special: Marijuana    Sexual activity: Yes     Birth control/ protection: None     Other Topics Concern    Not on file     Social History Narrative         ALLERGIES: Latex; Adhesive; Bactrim [sulfamethoprim]; Hydrocodone-acetaminophen; and Pcn [penicillins]    Review of Systems   Constitutional: Negative for chills and fever. HENT: Negative for rhinorrhea and sore throat. Eyes: Negative for pain and redness. Respiratory: Negative for chest tightness, shortness of breath and wheezing. Cardiovascular: Negative for chest pain and leg swelling. Gastrointestinal: Positive for abdominal pain and nausea. Negative for constipation, diarrhea, melena and vomiting. Genitourinary: Positive for dysuria. Negative for hematuria, vaginal bleeding and vaginal discharge. Musculoskeletal: Negative for back pain, gait problem, neck pain and neck stiffness. Skin: Negative for color change and rash. Neurological: Negative for weakness, numbness and headaches. Vitals:    05/14/18 1844   BP: 146/84   Pulse: 91   Resp: 18   Temp: 98.2 °F (36.8 °C)   SpO2: 98%   Weight: (!) 170.1 kg (375 lb)   Height: 5' 3\" (1.6 m)            Physical Exam   Constitutional: She is oriented to person, place, and time. She appears well-developed and well-nourished. HENT:   Head: Normocephalic and atraumatic. Neck: Normal range of motion. Neck supple. Cardiovascular: Normal rate and regular rhythm. No murmur heard. Pulmonary/Chest: Effort normal and breath sounds normal. She has no wheezes. Abdominal: Soft. Bowel sounds are normal. There is tenderness (lower abd). Genitourinary: Cervix exhibits no motion tenderness, no discharge and no friability. Right adnexum displays no mass and no tenderness. Left adnexum displays no mass and no tenderness. No tenderness or bleeding in the vagina. No foreign body in the vagina. Vaginal discharge found. Musculoskeletal: Normal range of motion. She exhibits no edema.    Neurological: She is alert and oriented to person, place, and time. Skin: Skin is warm and dry. Nursing note and vitals reviewed. MDM  Number of Diagnoses or Management Options  Diagnosis management comments: Ovarian cyst. Will discharge with follow up. Amount and/or Complexity of Data Reviewed  Clinical lab tests: ordered and reviewed  Tests in the medicine section of CPT®: ordered and reviewed    Patient Progress  Patient progress: stable        ED Course       Procedures      Results Include:    Recent Results (from the past 24 hour(s))   CBC WITH AUTOMATED DIFF    Collection Time: 05/14/18  7:04 PM   Result Value Ref Range    WBC 16.0 (H) 4.3 - 11.1 K/uL    RBC 3.94 (L) 4.05 - 5.25 M/uL    HGB 11.4 (L) 11.7 - 15.4 g/dL    HCT 34.2 (L) 35.8 - 46.3 %    MCV 86.8 79.6 - 97.8 FL    MCH 28.9 26.1 - 32.9 PG    MCHC 33.3 31.4 - 35.0 g/dL    RDW 14.3 11.9 - 14.6 %    PLATELET 557 700 - 195 K/uL    MPV 8.9 (L) 10.8 - 14.1 FL    NEUTROPHILS 74 47 - 75 %    LYMPHOCYTES 20 16 - 44 %    MONOCYTES 5 3 - 9 %    EOSINOPHILS 1 1 - 8 %    ABS. NEUTROPHILS 11.8 (H) 1.7 - 8.2 K/UL    ABS. LYMPHOCYTES 3.2 0.5 - 4.6 K/UL    ABS. MONOCYTES 0.8 0.1 - 1.3 K/UL    ABS. EOSINOPHILS 0.2 0.0 - 0.8 K/UL    RBC COMMENTS SLIGHT  POLYCHROMASIA        WBC COMMENTS OCCASIONAL      PLATELET COMMENTS ADEQUATE      DF MANUAL     METABOLIC PANEL, COMPREHENSIVE    Collection Time: 05/14/18  7:04 PM   Result Value Ref Range    Sodium 142 136 - 145 mmol/L    Potassium 2.9 (LL) 3.5 - 5.1 mmol/L    Chloride 106 98 - 107 mmol/L    CO2 27 21 - 32 mmol/L    Anion gap 9 7 - 16 mmol/L    Glucose 99 65 - 100 mg/dL    BUN 7 6 - 23 MG/DL    Creatinine 0.91 0.6 - 1.0 MG/DL    GFR est AA >60 >60 ml/min/1.73m2    GFR est non-AA >60 >60 ml/min/1.73m2    Calcium 8.3 8.3 - 10.4 MG/DL    Bilirubin, total 0.4 0.2 - 1.1 MG/DL    ALT (SGPT) 21 12 - 65 U/L    AST (SGOT) 17 15 - 37 U/L    Alk.  phosphatase 83 50 - 136 U/L    Protein, total 7.2 6.3 - 8.2 g/dL    Albumin 2.8 (L) 3.5 - 5.0 g/dL    Globulin 4.4 (H) 2.3 - 3.5 g/dL    A-G Ratio 0.6 (L) 1.2 - 3.5     HCG URINE, QL. - POC    Collection Time: 05/14/18  8:19 PM   Result Value Ref Range    Pregnancy test,urine (POC) NEGATIVE  NEG     URINE MICROSCOPIC    Collection Time: 05/14/18  8:23 PM   Result Value Ref Range    WBC 3-5 0 /hpf    RBC 0-3 0 /hpf    Epithelial cells 0-3 0 /hpf    Bacteria 0 0 /hpf    Casts 0-3 0 /lpf   WET PREP    Collection Time: 05/14/18  8:48 PM   Result Value Ref Range    Special Requests: NO SPECIAL REQUESTS      Wet prep WBC 0 TO 5 PER HPF     Wet prep NO YEAST,TRICHOMONAS OR CLUE CELLS NOTED     POC LACTIC ACID    Collection Time: 05/14/18  9:12 PM   Result Value Ref Range    Lactic Acid (POC) 0.4 (LL) 0.5 - 1.9 mmol/L     US PELV NON OB W TV (Final result) Result time: 05/14/18 22:26:14     Final result by Phill Guerra DO (05/14/18 22:26:14)     Impression:     IMPRESSION: Hypoechoic structures within both ovaries, with the larger on the  left. These findings likely represent complex cysts. Follow-up ultrasound in 2-3  months at a different portion of the patient's cycle would be recommended to  ensure resolution.     Narrative:     Pelvic and transvaginal ultrasound    HISTORY: Pelvic pain. History of hydrosalpinx on the left. Real-time pelvic and transvaginal ultrasounds performed. No prior studies are  available for comparison. The examination is technically limited due to patient  body habitus. Pelvic ultrasound: The uterus measures approximately 9.5 x 3.7 x 5.5 cm. The  endometrial complex measures 7 mm. A structure thought to represent the right  ovary was measured at approximately 4.5 x 5.0 x 3.4 cm containing a 3.2 cm  hypoechoic structure. The left ovary was measured at approximately 7.0 x 6.3 x  5.6 cm also containing a hypoechoic structure measuring up to 5.3 cm. Color flow  is present within both ovaries. Transvaginal imaging is recommended to further  assess the adnexa and endometrial complex.     Transvaginal ultrasound: The uterus appears unremarkable. Confirmed within the  right ovary is a 3.4 cm hypoechoic structure. Also within the left ovary is a  3.7 cm hypoechoic structure.  There is no free fluid.

## 2018-05-15 NOTE — DISCHARGE INSTRUCTIONS
Functional Ovarian Cyst: Care Instructions  Your Care Instructions    A functional ovarian cyst is a sac that forms on the surface of a woman's ovary during ovulation. The sac holds a maturing egg. Usually the sac goes away after the egg is released. But if the egg is not released, or if the sac closes up after the egg is released, the sac can swell up with fluid and form a cyst.  Functional ovarian cysts are different than ovarian growths caused by other problems, such as cancer. Most functional ovarian cysts cause no symptoms and go away on their own. Some cause mild pain. Others can cause severe pain when they rupture or bleed. Follow-up care is a key part of your treatment and safety. Be sure to make and go to all appointments, and call your doctor if you are having problems. It's also a good idea to know your test results and keep a list of the medicines you take. How can you care for yourself at home? · Use heat, such as a hot water bottle, a heating pad set on low, or a warm bath, to relax tense muscles and relieve cramping. · Be safe with medicines. Take pain medicines exactly as directed. ¨ If the doctor gave you a prescription medicine for pain, take it as prescribed. ¨ If you are not taking a prescription pain medicine, ask your doctor if you can take an over-the-counter medicine. · Avoid constipation. Make sure you drink enough fluids and include fruits, vegetables, and fiber in your diet each day. Constipation does not cause ovarian cysts, but it may make your pelvic pain worse. When should you call for help? Call your doctor now or seek immediate medical care if:  ? · You have severe vaginal bleeding. ? · You have new or worse belly or pelvic pain. ? Watch closely for changes in your health, and be sure to contact your doctor if:  ? · You have unusual vaginal bleeding. ? · You do not get better as expected. Where can you learn more?   Go to http://naz-shelly.info/. Enter A299 in the search box to learn more about \"Functional Ovarian Cyst: Care Instructions. \"  Current as of: October 13, 2016  Content Version: 11.4  © 5088-0391 [employer]. Care instructions adapted under license by [employer] (which disclaims liability or warranty for this information). If you have questions about a medical condition or this instruction, always ask your healthcare professional. [place] [address] any warranty or liability for your use of this information.

## 2018-05-15 NOTE — ED NOTES
I have reviewed discharge instructions with the patient. The patient verbalized understanding. Patient left ED via Discharge Method: ambulatory to Home. Opportunity for questions and clarification provided. Patient given 1 scripts. To continue your aftercare when you leave the hospital, you may receive an automated call from our care team to check in on how you are doing. This is a free service and part of our promise to provide the best care and service to meet your aftercare needs.  If you have questions, or wish to unsubscribe from this service please call [phone]. Thank you for Choosing our [facility] Emergency Department.

## 2018-05-17 LAB
C TRACH RRNA SPEC QL NAA+PROBE: NEGATIVE
N GONORRHOEA RRNA SPEC QL NAA+PROBE: NEGATIVE
SPECIMEN SOURCE: NORMAL

## 2018-06-01 ENCOUNTER — ANESTHESIA EVENT (OUTPATIENT)
Dept: SURGERY | Age: 43
End: 2018-06-01
Payer: MEDICARE

## 2018-06-04 ENCOUNTER — HOSPITAL ENCOUNTER (OUTPATIENT)
Age: 43
Setting detail: OUTPATIENT SURGERY
Discharge: HOME | End: 2018-06-04
Attending: ORTHOPAEDIC SURGERY | Admitting: ORTHOPAEDIC SURGERY
Payer: MEDICARE

## 2018-06-04 ENCOUNTER — ANESTHESIA (OUTPATIENT)
Dept: SURGERY | Age: 43
End: 2018-06-04
Payer: MEDICARE

## 2018-06-04 VITALS
TEMPERATURE: 97.6 F | SYSTOLIC BLOOD PRESSURE: 138 MMHG | OXYGEN SATURATION: 99 % | HEART RATE: 68 BPM | BODY MASS INDEX: 66.61 KG/M2 | DIASTOLIC BLOOD PRESSURE: 65 MMHG | RESPIRATION RATE: 16 BRPM | WEIGHT: 293 LBS

## 2018-06-04 LAB
GLUCOSE BLD STRIP.AUTO-MCNC: 103 MG/DL (ref 65–100)
HCG UR QL: NEGATIVE

## 2018-06-04 PROCEDURE — 81025 URINE PREGNANCY TEST: CPT

## 2018-06-04 PROCEDURE — 76210000063 HC OR PH I REC FIRST 0.5 HR: Performed by: ORTHOPAEDIC SURGERY

## 2018-06-04 PROCEDURE — 77030011884 HC TAPE CST PLSTR BSNM -A: Performed by: ORTHOPAEDIC SURGERY

## 2018-06-04 PROCEDURE — 74011250636 HC RX REV CODE- 250/636

## 2018-06-04 PROCEDURE — 74011250636 HC RX REV CODE- 250/636: Performed by: ORTHOPAEDIC SURGERY

## 2018-06-04 PROCEDURE — 74011000250 HC RX REV CODE- 250: Performed by: ORTHOPAEDIC SURGERY

## 2018-06-04 PROCEDURE — 76210000021 HC REC RM PH II 0.5 TO 1 HR: Performed by: ORTHOPAEDIC SURGERY

## 2018-06-04 PROCEDURE — 76010000154 HC OR TIME FIRST 0.5 HR: Performed by: ORTHOPAEDIC SURGERY

## 2018-06-04 PROCEDURE — 77030002986 HC SUT PROL J&J -A: Performed by: ORTHOPAEDIC SURGERY

## 2018-06-04 PROCEDURE — 74011250636 HC RX REV CODE- 250/636: Performed by: ANESTHESIOLOGY

## 2018-06-04 PROCEDURE — 82962 GLUCOSE BLOOD TEST: CPT

## 2018-06-04 PROCEDURE — 74011000250 HC RX REV CODE- 250: Performed by: ANESTHESIOLOGY

## 2018-06-04 PROCEDURE — 77030039266 HC ADH SKN EXOFIN S2SG -A: Performed by: ORTHOPAEDIC SURGERY

## 2018-06-04 PROCEDURE — 76060000031 HC ANESTHESIA FIRST 0.5 HR: Performed by: ORTHOPAEDIC SURGERY

## 2018-06-04 RX ORDER — SODIUM CHLORIDE 0.9 % (FLUSH) 0.9 %
5-10 SYRINGE (ML) INJECTION EVERY 8 HOURS
Status: DISCONTINUED | OUTPATIENT
Start: 2018-06-04 | End: 2018-06-04 | Stop reason: HOSPADM

## 2018-06-04 RX ORDER — SODIUM CHLORIDE 0.9 % (FLUSH) 0.9 %
5-10 SYRINGE (ML) INJECTION AS NEEDED
Status: DISCONTINUED | OUTPATIENT
Start: 2018-06-04 | End: 2018-06-04 | Stop reason: HOSPADM

## 2018-06-04 RX ORDER — BUPIVACAINE HYDROCHLORIDE 2.5 MG/ML
INJECTION, SOLUTION EPIDURAL; INFILTRATION; INTRACAUDAL AS NEEDED
Status: DISCONTINUED | OUTPATIENT
Start: 2018-06-04 | End: 2018-06-04 | Stop reason: HOSPADM

## 2018-06-04 RX ORDER — SODIUM CHLORIDE, SODIUM LACTATE, POTASSIUM CHLORIDE, CALCIUM CHLORIDE 600; 310; 30; 20 MG/100ML; MG/100ML; MG/100ML; MG/100ML
75 INJECTION, SOLUTION INTRAVENOUS CONTINUOUS
Status: DISCONTINUED | OUTPATIENT
Start: 2018-06-04 | End: 2018-06-04 | Stop reason: HOSPADM

## 2018-06-04 RX ORDER — MIDAZOLAM HYDROCHLORIDE 1 MG/ML
2 INJECTION, SOLUTION INTRAMUSCULAR; INTRAVENOUS
Status: DISCONTINUED | OUTPATIENT
Start: 2018-06-04 | End: 2018-06-04 | Stop reason: HOSPADM

## 2018-06-04 RX ORDER — PROPOFOL 10 MG/ML
INJECTION, EMULSION INTRAVENOUS AS NEEDED
Status: DISCONTINUED | OUTPATIENT
Start: 2018-06-04 | End: 2018-06-04 | Stop reason: HOSPADM

## 2018-06-04 RX ORDER — FLUMAZENIL 0.1 MG/ML
0.2 INJECTION INTRAVENOUS
Status: DISCONTINUED | OUTPATIENT
Start: 2018-06-04 | End: 2018-06-04 | Stop reason: HOSPADM

## 2018-06-04 RX ORDER — NALOXONE HYDROCHLORIDE 0.4 MG/ML
0.1 INJECTION, SOLUTION INTRAMUSCULAR; INTRAVENOUS; SUBCUTANEOUS
Status: DISCONTINUED | OUTPATIENT
Start: 2018-06-04 | End: 2018-06-04 | Stop reason: HOSPADM

## 2018-06-04 RX ORDER — HYDROMORPHONE HYDROCHLORIDE 2 MG/ML
0.5 INJECTION, SOLUTION INTRAMUSCULAR; INTRAVENOUS; SUBCUTANEOUS
Status: DISCONTINUED | OUTPATIENT
Start: 2018-06-04 | End: 2018-06-04 | Stop reason: HOSPADM

## 2018-06-04 RX ORDER — ACETAMINOPHEN 10 MG/ML
1000 INJECTION, SOLUTION INTRAVENOUS ONCE
Status: COMPLETED | OUTPATIENT
Start: 2018-06-04 | End: 2018-06-04

## 2018-06-04 RX ORDER — LIDOCAINE HYDROCHLORIDE 10 MG/ML
0.1 INJECTION INFILTRATION; PERINEURAL AS NEEDED
Status: DISCONTINUED | OUTPATIENT
Start: 2018-06-04 | End: 2018-06-04 | Stop reason: HOSPADM

## 2018-06-04 RX ORDER — NALBUPHINE HYDROCHLORIDE 20 MG/ML
5 INJECTION, SOLUTION INTRAMUSCULAR; INTRAVENOUS; SUBCUTANEOUS
Status: DISCONTINUED | OUTPATIENT
Start: 2018-06-04 | End: 2018-06-04 | Stop reason: HOSPADM

## 2018-06-04 RX ORDER — MIDAZOLAM HYDROCHLORIDE 1 MG/ML
INJECTION, SOLUTION INTRAMUSCULAR; INTRAVENOUS AS NEEDED
Status: DISCONTINUED | OUTPATIENT
Start: 2018-06-04 | End: 2018-06-04 | Stop reason: HOSPADM

## 2018-06-04 RX ORDER — LIDOCAINE HYDROCHLORIDE 10 MG/ML
INJECTION INFILTRATION; PERINEURAL AS NEEDED
Status: DISCONTINUED | OUTPATIENT
Start: 2018-06-04 | End: 2018-06-04 | Stop reason: HOSPADM

## 2018-06-04 RX ORDER — PROPOFOL 10 MG/ML
INJECTION, EMULSION INTRAVENOUS
Status: DISCONTINUED | OUTPATIENT
Start: 2018-06-04 | End: 2018-06-04 | Stop reason: HOSPADM

## 2018-06-04 RX ORDER — SODIUM CHLORIDE, SODIUM LACTATE, POTASSIUM CHLORIDE, CALCIUM CHLORIDE 600; 310; 30; 20 MG/100ML; MG/100ML; MG/100ML; MG/100ML
100 INJECTION, SOLUTION INTRAVENOUS CONTINUOUS
Status: DISCONTINUED | OUTPATIENT
Start: 2018-06-04 | End: 2018-06-04 | Stop reason: HOSPADM

## 2018-06-04 RX ORDER — OXYCODONE HYDROCHLORIDE 5 MG/1
5 TABLET ORAL
Status: DISCONTINUED | OUTPATIENT
Start: 2018-06-04 | End: 2018-06-04 | Stop reason: HOSPADM

## 2018-06-04 RX ADMIN — MIDAZOLAM HYDROCHLORIDE 1 MG: 1 INJECTION, SOLUTION INTRAMUSCULAR; INTRAVENOUS at 07:07

## 2018-06-04 RX ADMIN — ACETAMINOPHEN 1000 MG: 10 INJECTION, SOLUTION INTRAVENOUS at 08:40

## 2018-06-04 RX ADMIN — PROPOFOL 40 MG: 10 INJECTION, EMULSION INTRAVENOUS at 07:37

## 2018-06-04 RX ADMIN — PROPOFOL 140 MCG/KG/MIN: 10 INJECTION, EMULSION INTRAVENOUS at 07:38

## 2018-06-04 RX ADMIN — PROPOFOL 20 MG: 10 INJECTION, EMULSION INTRAVENOUS at 07:38

## 2018-06-04 RX ADMIN — SODIUM CHLORIDE, SODIUM LACTATE, POTASSIUM CHLORIDE, AND CALCIUM CHLORIDE: 600; 310; 30; 20 INJECTION, SOLUTION INTRAVENOUS at 07:31

## 2018-06-04 RX ADMIN — PROPOFOL 20 MG: 10 INJECTION, EMULSION INTRAVENOUS at 07:40

## 2018-06-04 RX ADMIN — PROPOFOL 20 MG: 10 INJECTION, EMULSION INTRAVENOUS at 07:45

## 2018-06-04 RX ADMIN — SODIUM CHLORIDE, SODIUM LACTATE, POTASSIUM CHLORIDE, AND CALCIUM CHLORIDE 100 ML/HR: 600; 310; 30; 20 INJECTION, SOLUTION INTRAVENOUS at 06:27

## 2018-06-04 RX ADMIN — MIDAZOLAM HYDROCHLORIDE 1 MG: 1 INJECTION, SOLUTION INTRAMUSCULAR; INTRAVENOUS at 07:45

## 2018-06-04 RX ADMIN — PROPOFOL 10 MG: 10 INJECTION, EMULSION INTRAVENOUS at 07:47

## 2018-06-04 RX ADMIN — HYDROMORPHONE HYDROCHLORIDE 0.5 MG: 2 INJECTION, SOLUTION INTRAMUSCULAR; INTRAVENOUS; SUBCUTANEOUS at 08:08

## 2018-06-04 RX ADMIN — Medication 3 G: at 07:37

## 2018-06-04 RX ADMIN — PROPOFOL 10 MG: 10 INJECTION, EMULSION INTRAVENOUS at 07:49

## 2018-06-04 RX ADMIN — PROPOFOL 30 MG: 10 INJECTION, EMULSION INTRAVENOUS at 07:39

## 2018-06-04 RX ADMIN — PROPOFOL 10 MG: 10 INJECTION, EMULSION INTRAVENOUS at 07:42

## 2018-06-04 RX ADMIN — FAMOTIDINE 20 MG: 10 INJECTION, SOLUTION INTRAVENOUS at 06:28

## 2018-06-04 NOTE — BRIEF OP NOTE
BRIEF OPERATIVE NOTE    Date of Procedure: 6/4/2018   Preoperative Diagnosis: De Quervain's tenosynovitis, right [M65.4]  Postoperative Diagnosis: De Quervain's tenosynovitis, right     Procedure(s):  DEQUERVAINS RELEASE RIGHT  Surgeon(s) and Role:     * Ezra Rodriguez MD - Primary         Surgical Assistant: KENTON    Surgical Staff:  Circ-1: Jena Desai RN  Scrub Tech-1: [facility]  Event Time In   Incision Start 9342   Incision Close 5818     Anesthesia: MAC   Estimated Blood Loss: MINIMAL  Specimens: * No specimens in log *   Findings: SEE DICTATION   Complications: NONE  Implants: * No implants in log *

## 2018-06-04 NOTE — IP AVS SNAPSHOT
[address] [place] [address]  [phone]     Patient: Corin Randall  MRN: [id number]  :1975            A check tae indicates which time of day the medication should be taken. My Medications      CONTINUE taking these medications        Instructions Each Dose to Equal    Morning Noon Evening Bedtime    AZO CRANBERRY 450-30-50 mg-mg-million Tab       Your last dose was: Your next dose is: Take  by mouth. Biotin 2,500 mcg Cap       Your last dose was: Your next dose is: Take 10,000 mcg by mouth. 60751 mcg                        calcium 500 mg Tab       Your last dose was: Your next dose is: Take  by mouth. * EFFEXOR  mg capsule   Generic drug:  venlafaxine-SR       Your last dose was: Your next dose is: Take 225 mg by mouth daily. 225 mg                        * EFFEXOR XR 75 mg capsule   Generic drug:  venlafaxine-SR       Your last dose was: Your next dose is: Take 225 mg by mouth daily. 225 mg                        FLONASE 50 mcg/actuation nasal spray   Generic drug:  fluticasone       Your last dose was: Your next dose is:              nightly. folic acid 148 mcg tablet       Your last dose was: Your next dose is: Take 800 mcg by mouth daily. 800 mcg                        hydrOXYzine HCl 25 mg tablet   Commonly known as:  ATARAX       Your last dose was: Your next dose is: Take 25 mg by mouth nightly. 25 mg                        loratadine-pseudoephedrine 5-120 mg per tablet   Commonly known as:  CLARITIN-D 12 HOUR       Your last dose was: Your next dose is: Take 1 Tab by mouth two (2) times a day.     1 Tab                        nystatin powder   Commonly known as: MYCOSTATIN       Your last dose was: Your next dose is:              Apply  to affected area four (4) times daily. POTASSIUM GLUCONATE PO       Your last dose was: Your next dose is: Take  by mouth. PRILOSEC PO       Your last dose was: Your next dose is:              take by mouth.                         promethazine 25 mg tablet   Commonly known as:  PHENERGAN       Your last dose was: Your next dose is: Take 25 mg by mouth every six (6) hours as needed for Nausea. 25 mg                        traMADol 50 mg tablet   Commonly known as:  ULTRAM       Your last dose was: Your next dose is: Take 1 Tab by mouth every six (6) hours as needed for Pain. Max Daily Amount: 200 mg.    50 mg                        traZODone 50 mg tablet   Commonly known as:  DESYREL       Your last dose was: Your next dose is: Take 50 mg by mouth nightly. 50 mg                        VITAMIN D3 1,000 unit Cap   Generic drug:  cholecalciferol       Your last dose was: Your next dose is: Take  by mouth. ZOVIRAX 200 mg capsule   Generic drug:  acyclovir       Your last dose was: Your next dose is: Take 200 mg by mouth three (3) times daily as needed. 200 mg                        * Notice: This list has 2 medication(s) that are the same as other medications prescribed for you. Read the directions carefully, and ask your doctor or other care provider to review them with you.

## 2018-06-04 NOTE — ANESTHESIA PREPROCEDURE EVALUATION
Anesthetic History   No history of anesthetic complications            Review of Systems / Medical History  Patient summary reviewed and pertinent labs reviewed    Pulmonary        Sleep apnea: No treatment           Neuro/Psych         Psychiatric history     Cardiovascular    Hypertension              Exercise tolerance: >4 METS  Comments: Denies recent CP, SOB or Palpitations   GI/Hepatic/Renal  Within defined limits              Endo/Other    Diabetes    Morbid obesity (Supramorbid obesity)     Other Findings              Physical Exam    Airway  Mallampati: II  TM Distance: > 6 cm  Neck ROM: normal range of motion, short neck   Mouth opening: Normal     Cardiovascular  Regular rate and rhythm,  S1 and S2 normal,  no murmur, click, rub, or gallop             Dental  No notable dental hx       Pulmonary  Breath sounds clear to auscultation               Abdominal  GI exam deferred       Other Findings            Anesthetic Plan    ASA: 3  Anesthesia type: total IV anesthesia          Induction: Intravenous  Anesthetic plan and risks discussed with: Patient      Discussed extensively risks and benefits of deep sedation vs. MAC with local vs. GETA with patient.

## 2018-06-04 NOTE — ANESTHESIA POSTPROCEDURE EVALUATION
Post-Anesthesia Evaluation and Assessment    Patient: Pallavi Albert MRN: [id number]  SSN: [id number]    YOB: 1975  Age: 37 y.o. Sex: female       Cardiovascular Function/Vital Signs  Visit Vitals    /65 (BP 1 Location: Left arm, BP Patient Position: At rest)    Pulse 68    Temp 36.4 °C (97.6 °F)    Resp 16    Wt (!) 170.6 kg (376 lb)    SpO2 99%    BMI 66.61 kg/m2       Patient is status post total IV anesthesia anesthesia for Procedure(s):  DEQUERVAINS RELEASE RIGHT. Nausea/Vomiting: None    Postoperative hydration reviewed and adequate. Pain:  Pain Scale 1: Numeric (0 - 10) (06/04/18 0842)  Pain Intensity 1: 7 (06/04/18 0842)   Managed    Neurological Status:   Neuro (WDL): Exceptions to WDL (06/04/18 0800)  Neuro  Neurologic State: Anesthetized;Drowsy (06/04/18 0800)   At baseline    Mental Status and Level of Consciousness: Arousable    Pulmonary Status:   O2 Device: Room air (06/04/18 0845)   Adequate oxygenation and airway patent    Complications related to anesthesia: None    Post-anesthesia assessment completed.  No concerns    Signed By: Shanta Springer MD     June 4, 2018

## 2018-06-04 NOTE — DISCHARGE INSTRUCTIONS
Keep dressing clean, dry and intact until post op day number 2-3. Then may shower, pat dry and keep covered until follow up. Do not scrub incision or submerge under water. Move fingers, elevate, and ice to prevent swelling. No heavy lifting. ACTIVITY    Move fingers, elevate, and ice to prevent swelling. No heavy lifting  As tolerated and as directed by your doctor. Bathe or shower as directed by your doctor but keep dressing dry and intact for 2 to 3 days. Do not submerge. Running water only. DIET  · Clear liquids until no nausea or vomiting; then light diet for the first day. · Advance to regular diet on second day, unless your doctor orders otherwise. · If nausea and vomiting continues, call your doctor. · Avoid greasy and spicy food today to reduce nausea. PAIN  · Take pain medication as directed by your doctor. · Call your doctor if pain is NOT relieved by medication. · DO NOT take aspirin of blood thinners unless directed by your doctor. · Take pain pills with food to reduce nausea  · Pain pills may cause constipation. May use stool softener    DRESSING CARE Keep dressing clean, dry and intact until post op day number 2-3. Then may shower, pat dry and keep covered until follow up. Do not scrub incision or submerge under water. CALL YOUR DOCTOR IF   · Excessive bleeding that does not stop after holding pressure over the area  · Temperature of 101 degrees F or above  · Excessive redness, swelling or bruising, and/ or green or yellow, smelly discharge from incision    AFTER ANESTHESIA   · For the first 24 hours: DO NOT Drive, Drink alcoholic beverages, or Make important decisions. · Be aware of dizziness following anesthesia and while taking pain medication. APPOINTMENT DATE/ TIME: Alida 15, 2018 at 9:15 a.m. [facility]     YOUR DOCTOR'S PHONE NUMBER [phone]      DISCHARGE SUMMARY from Nurse    PATIENT INSTRUCTIONS:    After general anesthesia or intravenous sedation, for 24 hours or while taking prescription Narcotics:  · Limit your activities  · Do not drive and operate hazardous machinery  · Do not make important personal or business decisions  · Do  not drink alcoholic beverages  · If you have not urinated within 8 hours after discharge, please contact your surgeon on call. *  Please give a list of your current medications to your Primary Care Provider. *  Please update this list whenever your medications are discontinued, doses are      changed, or new medications (including over-the-counter products) are added. *  Please carry medication information at all times in case of emergency situations. These are general instructions for a healthy lifestyle:    No smoking/ No tobacco products/ Avoid exposure to second hand smoke    Surgeon General's Warning:  Quitting smoking now greatly reduces serious risk to your health. Obesity, smoking, and sedentary lifestyle greatly increases your risk for illness    A healthy diet, regular physical exercise & weight monitoring are important for maintaining a healthy lifestyle    You may be retaining fluid if you have a history of heart failure or if you experience any of the following symptoms:  Weight gain of 3 pounds or more overnight or 5 pounds in a week, increased swelling in our hands or feet or shortness of breath while lying flat in bed. Please call your doctor as soon as you notice any of these symptoms; do not wait until your next office visit. Recognize signs and symptoms of STROKE:    F-face looks uneven    A-arms unable to move or move unevenly    S-speech slurred or non-existent    T-time-call 911 as soon as signs and symptoms begin-DO NOT go       Back to bed or wait to see if you get better-TIME IS BRAIN.

## 2018-06-04 NOTE — IP AVS SNAPSHOT
Summary of Care Report       The Summary of Care report has been created to help improve care coordination. Users with access to [place] or [address] (Web-based application) may access additional patient information including the Discharge Summary. If you are not currently a [address] user and need more information, please call the number listed below in the [place] [address] section and ask to be connected with Medical Records. Facility Information     Name Address Phone       [address] [facility] [address] [place] [id number] [phone]       Patient Information     Patient Name Sex     Louann Hill ([id number]) Female 1975      Discharge Information     Admitting Provider Service Area Unit    Tess Villafuerte MD / [address] [place] [address] / [phone]    Discharge Provider Discharge Date/Time Discharge Disposition Destination    (none) 2018 (Pending) [facility] (none)      Patient Language     Language    ENGLISH [13]      You are allergic to the following     Allergen Reactions    Latex Rash         Adhesive Contact Dermatitis         Zofran (Ondansetron Hcl) Nausea and Vomiting         Current Discharge Medication List      CONTINUE these medications which have NOT CHANGED        Dose & Instructions Dispensing Information Comments    Suzi Jay 960-98-84 mg-mg-million Tab    Take  by mouth. Refills:  0        Biotin 2,500 mcg Cap    Dose:  63738 mcg   Take 10,000 mcg by mouth. Refills:  0        calcium 500 mg Tab    Take  by mouth. Refills:  0        * EFFEXOR  mg capsule   Generic drug:  venlafaxine-SR    Dose:  225 mg   Take 225 mg by mouth daily. Refills:  0        * EFFEXOR XR 75 mg capsule   Generic drug:  venlafaxine-SR    Dose:  225 mg   Take 225 mg by mouth daily.     Refills:  0        FLONASE 50 mcg/actuation nasal spray   Generic drug: fluticasone    nightly. Refills:  0        folic acid 302 mcg tablet    Dose:  800 mcg   Take 800 mcg by mouth daily. Refills:  0        hydrOXYzine HCl 25 mg tablet   Commonly known as:  ATARAX    Dose:  25 mg   Take 25 mg by mouth nightly. Refills:  0        loratadine-pseudoephedrine 5-120 mg per tablet   Commonly known as:  CLARITIN-D 12 HOUR    Dose:  1 Tab   Take 1 Tab by mouth two (2) times a day. Quantity:  30 Tab   Refills:  1        nystatin powder   Commonly known as:  MYCOSTATIN    Apply  to affected area four (4) times daily. Refills:  0        POTASSIUM GLUCONATE PO    Take  by mouth. Refills:  0        PRILOSEC PO    take by mouth. Refills:  0        promethazine 25 mg tablet   Commonly known as:  PHENERGAN    Dose:  25 mg   Take 25 mg by mouth every six (6) hours as needed for Nausea. Refills:  0        traMADol 50 mg tablet   Commonly known as:  ULTRAM    Dose:  50 mg   Take 1 Tab by mouth every six (6) hours as needed for Pain. Max Daily Amount: 200 mg. Quantity:  20 Tab   Refills:  0        traZODone 50 mg tablet   Commonly known as:  DESYREL    Dose:  50 mg   Take 50 mg by mouth nightly. Refills:  0        VITAMIN D3 1,000 unit Cap   Generic drug:  cholecalciferol    Take  by mouth. Refills:  0        ZOVIRAX 200 mg capsule   Generic drug:  acyclovir    Dose:  200 mg   Take 200 mg by mouth three (3) times daily as needed. Refills:  0        * Notice: This list has 2 medication(s) that are the same as other medications prescribed for you. Read the directions carefully, and ask your doctor or other care provider to review them with you.             Surgery Information     ID Date/Time Status Primary Surgeon All Procedures Location          [id number] 6/4/2018 0730 Unposted Tony Sanchez MD [facility]        Follow-up Information     Follow up With Details Comments Contact Info    Not On File [facility]   Not On File (62) Patient has a PCP but that physician is not listed in [address] [place] [address]. Discharge Instructions       Keep dressing clean, dry and intact until post op day number 2-3. Then may shower, pat dry and keep covered until follow up. Do not scrub incision or submerge under water. Move fingers, elevate, and ice to prevent swelling. No heavy lifting. ACTIVITY    Move fingers, elevate, and ice to prevent swelling. No heavy lifting  As tolerated and as directed by your doctor. Bathe or shower as directed by your doctor but keep dressing dry and intact for 2 to 3 days. Do not submerge. Running water only. DIET  · Clear liquids until no nausea or vomiting; then light diet for the first day. · Advance to regular diet on second day, unless your doctor orders otherwise. · If nausea and vomiting continues, call your doctor. · Avoid greasy and spicy food today to reduce nausea. PAIN  · Take pain medication as directed by your doctor. · Call your doctor if pain is NOT relieved by medication. · DO NOT take aspirin of blood thinners unless directed by your doctor. · Take pain pills with food to reduce nausea  · Pain pills may cause constipation. May use stool softener    DRESSING CARE Keep dressing clean, dry and intact until post op day number 2-3. Then may shower, pat dry and keep covered until follow up. Do not scrub incision or submerge under water. CALL YOUR DOCTOR IF   · Excessive bleeding that does not stop after holding pressure over the area  · Temperature of 101 degrees F or above  · Excessive redness, swelling or bruising, and/ or green or yellow, smelly discharge from incision    AFTER ANESTHESIA   · For the first 24 hours: DO NOT Drive, Drink alcoholic beverages, or Make important decisions. · Be aware of dizziness following anesthesia and while taking pain medication. APPOINTMENT DATE/ TIME: Alida 15, 2018 at 9:15 a.m. [address] [place] [address]     YOUR DOCTOR'S PHONE NUMBER [phone]      DISCHARGE SUMMARY from Nurse    PATIENT INSTRUCTIONS:    After general anesthesia or intravenous sedation, for 24 hours or while taking prescription Narcotics:  · Limit your activities  · Do not drive and operate hazardous machinery  · Do not make important personal or business decisions  · Do  not drink alcoholic beverages  · If you have not urinated within 8 hours after discharge, please contact your surgeon on call. *  Please give a list of your current medications to your Primary Care Provider. *  Please update this list whenever your medications are discontinued, doses are      changed, or new medications (including over-the-counter products) are added. *  Please carry medication information at all times in case of emergency situations. These are general instructions for a healthy lifestyle:    No smoking/ No tobacco products/ Avoid exposure to second hand smoke    Surgeon General's Warning:  Quitting smoking now greatly reduces serious risk to your health. Obesity, smoking, and sedentary lifestyle greatly increases your risk for illness    A healthy diet, regular physical exercise & weight monitoring are important for maintaining a healthy lifestyle    You may be retaining fluid if you have a history of heart failure or if you experience any of the following symptoms:  Weight gain of 3 pounds or more overnight or 5 pounds in a week, increased swelling in our hands or feet or shortness of breath while lying flat in bed. Please call your doctor as soon as you notice any of these symptoms; do not wait until your next office visit. Recognize signs and symptoms of STROKE:    F-face looks uneven    A-arms unable to move or move unevenly    S-speech slurred or non-existent    T-time-call 911 as soon as signs and symptoms begin-DO NOT go       Back to bed or wait to see if you get better-TIME IS BRAIN.       Chart Review Routing History     No Routing History on File

## 2018-06-04 NOTE — IP AVS SNAPSHOT
[address] [facility] 59  [phone]     Patient: Randy Bergeron  MRN: [id number]  :1975           About your hospitalization     You were admitted on:  2018 You last received care in the:  [facility] OP PACU    You were discharged on:  2018       Why you were hospitalized     Your primary diagnosis was:  Not on File      Follow-up Information     Follow up With Details Comments Contact Info    Not On File Bshsi   Not On File (62) Patient has a PCP but that physician is not listed in [facility]. Discharge Orders     None       A check tae indicates which time of day the medication should be taken. My Medications      CONTINUE taking these medications        Instructions Each Dose to Equal    Morning Noon Evening Bedtime    AZO CRANBERRY 450-30-50 mg-mg-million Tab       Your last dose was: Your next dose is: Take  by mouth. Biotin 2,500 mcg Cap       Your last dose was: Your next dose is: Take 10,000 mcg by mouth. 80066 mcg                        calcium 500 mg Tab       Your last dose was: Your next dose is: Take  by mouth. * EFFEXOR  mg capsule   Generic drug:  venlafaxine-SR       Your last dose was: Your next dose is: Take 225 mg by mouth daily. 225 mg                        * EFFEXOR XR 75 mg capsule   Generic drug:  venlafaxine-SR       Your last dose was: Your next dose is: Take 225 mg by mouth daily. 225 mg                        FLONASE 50 mcg/actuation nasal spray   Generic drug:  fluticasone       Your last dose was: Your next dose is:              nightly. folic acid 778 mcg tablet       Your last dose was: Your next dose is: Take 800 mcg by mouth daily.     800 mcg hydrOXYzine HCl 25 mg tablet   Commonly known as:  ATARAX       Your last dose was: Your next dose is: Take 25 mg by mouth nightly. 25 mg                        loratadine-pseudoephedrine 5-120 mg per tablet   Commonly known as:  CLARITIN-D 12 HOUR       Your last dose was: Your next dose is: Take 1 Tab by mouth two (2) times a day. 1 Tab                        nystatin powder   Commonly known as:  MYCOSTATIN       Your last dose was: Your next dose is:              Apply  to affected area four (4) times daily. POTASSIUM GLUCONATE PO       Your last dose was: Your next dose is: Take  by mouth. PRILOSEC PO       Your last dose was: Your next dose is:              take by mouth.                         promethazine 25 mg tablet   Commonly known as:  PHENERGAN       Your last dose was: Your next dose is: Take 25 mg by mouth every six (6) hours as needed for Nausea. 25 mg                        traMADol 50 mg tablet   Commonly known as:  ULTRAM       Your last dose was: Your next dose is: Take 1 Tab by mouth every six (6) hours as needed for Pain. Max Daily Amount: 200 mg.    50 mg                        traZODone 50 mg tablet   Commonly known as:  DESYREL       Your last dose was: Your next dose is: Take 50 mg by mouth nightly. 50 mg                        VITAMIN D3 1,000 unit Cap   Generic drug:  cholecalciferol       Your last dose was: Your next dose is: Take  by mouth. ZOVIRAX 200 mg capsule   Generic drug:  acyclovir       Your last dose was: Your next dose is: Take 200 mg by mouth three (3) times daily as needed. 200 mg                        * Notice: This list has 2 medication(s) that are the same as other medications prescribed for you. Read the directions carefully, and ask your doctor or other care provider to review them with you. Opioid Education     Prescription Opioids: What You Need to Know:    Prescription opioids can be used to help relieve moderate-to-severe pain and are often prescribed following a surgery or injury, or for certain health conditions. These medications can be an important part of treatment but also come with serious risks. Opioids are strong pain medicines. Examples include hydrocodone, oxycodone, fentanyl, and morphine. Heroin is an example of an illegal opioid. It is important to work with your health care provider to make sure you are getting the safest, most effective care. WHAT ARE THE RISKS AND SIDE EFFECTS OF OPIOID USE? Prescription opioids carry serious risks of addiction and overdose, especially with prolonged use. An opioid overdose, often marked by slow breathing, can cause sudden death. The use of prescription opioids can have a number of side effects as well, even when taken as directed. · Tolerance-meaning you might need to take more of a medication for the same pain relief  · Physical dependence-meaning you have symptoms of withdrawal when the medication is stopped. Withdrawal symptoms can include nausea, sweating, chills, diarrhea, stomach cramps, and muscle aches. Withdrawal can last up to several weeks, depending on which drug you took and how long you took it. · Increased sensitivity to pain  · Constipation  · Nausea, vomiting, and dry mouth  · Sleepiness and dizziness  · Confusion  · Depression  · Low levels of testosterone that can result in lower sex drive, energy, and strength  · Itching and sweating    RISKS ARE GREATER WITH:       · History of drug misuse, substance use disorder, or overdose  · Mental health conditions (such as depression or anxiety)  · Sleep apnea  · Older age (72 years or older)  · Pregnancy    Avoid alcohol while taking prescription opioids.   Also, unless specifically advised by your health care provider, medications to avoid include:  · Benzodiazepines (such as Xanax or Valium)  · Muscle relaxants (such as Soma or Flexeril)  · Hypnotics (such as Ambien or Lunesta)  · Other prescription opioids    KNOW YOUR OPTIONS  Talk to your health care provider about ways to manage your pain that don't involve prescription opioids. Some of these options may actually work better and have fewer risks and side effects. Options may include:  · Pain relievers such as acetaminophen, ibuprofen, and naproxen  · Some medications that are also used for depression or seizures  · Physical therapy and exercise  · Counseling to help patients learn how to cope better with triggers of pain and stress. · Application of heat or cold compress  · Massage therapy  · Relaxation techniques     Be Informed  Make sure you know the name of your medication, how much and how often to take it, and its potential risks & side effects. IF YOU ARE PRESCRIBED OPIOIDS FOR PAIN:  · Never take opioids in greater amounts or more often than prescribed. Remember the goal is not to be pain-free but to manage your pain at a tolerable level. · Follow up with your primary care provider to:  · Work together to create a plan on how to manage your pain. · Talk about ways to help manage your pain that don't involve prescription opioids. · Talk about any and all concerns and side effects. · Help prevent misuse and abuse. · Never sell or share prescription opioids  · Help prevent misuse and abuse. · Store prescription opioids in a secure place and out of reach of others (this may include visitors, children, friends, and family). · Safely dispose of unused/unwanted prescription opioids: Find your community drug take-back program or your pharmacy mail-back program, or flush them down the toilet, following guidance from the Food and Drug Administration (www.fda.gov/Drugs/ResourcesForYou).   · Visit www.cdc.gov/drugoverdose to learn about the risks of opioid abuse and overdose. · If you believe you may be struggling with addiction, tell your health care provider and ask for guidance or call [facility] at 6-690-289-RASL. Discharge Instructions       Keep dressing clean, dry and intact until post op day number 2-3. Then may shower, pat dry and keep covered until follow up. Do not scrub incision or submerge under water. Move fingers, elevate, and ice to prevent swelling. No heavy lifting. ACTIVITY    Move fingers, elevate, and ice to prevent swelling. No heavy lifting  As tolerated and as directed by your doctor. Bathe or shower as directed by your doctor but keep dressing dry and intact for 2 to 3 days. Do not submerge. Running water only. DIET  · Clear liquids until no nausea or vomiting; then light diet for the first day. · Advance to regular diet on second day, unless your doctor orders otherwise. · If nausea and vomiting continues, call your doctor. · Avoid greasy and spicy food today to reduce nausea. PAIN  · Take pain medication as directed by your doctor. · Call your doctor if pain is NOT relieved by medication. · DO NOT take aspirin of blood thinners unless directed by your doctor. · Take pain pills with food to reduce nausea  · Pain pills may cause constipation. May use stool softener    DRESSING CARE Keep dressing clean, dry and intact until post op day number 2-3. Then may shower, pat dry and keep covered until follow up. Do not scrub incision or submerge under water.       CALL YOUR DOCTOR IF   · Excessive bleeding that does not stop after holding pressure over the area  · Temperature of 101 degrees F or above  · Excessive redness, swelling or bruising, and/ or green or yellow, smelly discharge from incision    AFTER ANESTHESIA   · For the first 24 hours: DO NOT Drive, Drink alcoholic beverages, or Make important decisions. · Be aware of dizziness following anesthesia and while taking pain medication. APPOINTMENT DATE/ TIME: Alida 15, 2018 at 9:15 a.m. [address] [facility] YOUR DOCTOR'S PHONE NUMBER [phone]      DISCHARGE SUMMARY from Nurse    PATIENT INSTRUCTIONS:    After general anesthesia or intravenous sedation, for 24 hours or while taking prescription Narcotics:  · Limit your activities  · Do not drive and operate hazardous machinery  · Do not make important personal or business decisions  · Do  not drink alcoholic beverages  · If you have not urinated within 8 hours after discharge, please contact your surgeon on call. *  Please give a list of your current medications to your Primary Care Provider. *  Please update this list whenever your medications are discontinued, doses are      changed, or new medications (including over-the-counter products) are added. *  Please carry medication information at all times in case of emergency situations. These are general instructions for a healthy lifestyle:    No smoking/ No tobacco products/ Avoid exposure to second hand smoke    Surgeon General's Warning:  Quitting smoking now greatly reduces serious risk to your health. Obesity, smoking, and sedentary lifestyle greatly increases your risk for illness    A healthy diet, regular physical exercise & weight monitoring are important for maintaining a healthy lifestyle    You may be retaining fluid if you have a history of heart failure or if you experience any of the following symptoms:  Weight gain of 3 pounds or more overnight or 5 pounds in a week, increased swelling in our hands or feet or shortness of breath while lying flat in bed. Please call your doctor as soon as you notice any of these symptoms; do not wait until your next office visit.     Recognize signs and symptoms of STROKE:    F-face looks uneven    A-arms unable to move or move unevenly    S-speech slurred or non-existent    T-time-call 911 as soon as signs and symptoms begin-DO NOT go       Back to bed or wait to see if you get better-TIME IS BRAIN. Introducing [facility]! [facility] introduces [employer] patient portal. Now you can access parts of your medical record, email your doctor's office, and request medication refills online. 1. In your internet browser, go to [email or web address]   2. Click on the First Time User? Click Here link in the Sign In box. You will see the New Member Sign Up page. 3. Enter your [employer] Access Code exactly as it appears below. You will not need to use this code after youve completed the sign-up process. If you do not sign up before the expiration date, you must request a new code. · [employer] Access Code: [email or web address]  Expires: 8/5/2018  6:55 PM    4. Enter the last four digits of your Social Security Number (xxxx) and Date of Birth (mm/dd/yyyy) as indicated and click Submit. You will be taken to the next sign-up page. 5. Create a [employer] ID. This will be your [employer] login ID and cannot be changed, so think of one that is secure and easy to remember. 6. Create a [employer] password. You can change your password at any time. 7. Enter your Password Reset Question and Answer. This can be used at a later time if you forget your password. 8. Enter your e-mail address. You will receive e-mail notification when new information is available in [address] 9. Click Sign Up. You can now view and download portions of your medical record. 10. Click the Download Summary menu link to download a portable copy of your medical information. If you have questions, please visit the Frequently Asked Questions section of the [employer] website. Remember, [employer] is NOT to be used for urgent needs. For medical emergencies, dial 911. Now available from your iPhone and Android!           Introducing Vicente Sandoval       As a [facility] patient, I wanted to make you aware of our electronic visit tool called Vicente Sandoval. [employer] allows you to connect within minutes with a medical provider 24 hours a day, seven days a week via a mobile device or tablet or logging into a secure website from your computer. You can access Vicente [employer]in from anywhere in the [place]. A virtual visit might be right for you when you have a simple condition and feel like you just dont want to get out of bed, or cant get away from work for an appointment, when your regular [employer] provider is not available (evenings, weekends or holidays), or when youre out of town and need minor care. Electronic visits cost only $49 and if the [employer] provider determines a prescription is needed to treat your condition, one can be electronically transmitted to a nearby pharmacy*. Please take a moment to enroll today if you have not already done so. The enrollment process is free and takes just a few minutes. To enroll, please download the [employer] analia to your tablet or phone, or visit [email or web address] to enroll on your computer. And, as an [address] [place] [address] patient with a [employer] account, the results of your visits will be scanned into your electronic medical record and your primary care provider will be able to view the scanned results. We urge you to continue to see your regular [employer] provider for your ongoing medical care. And while your primary care provider may not be the one available when you seek a Vicente Sandoval virtual visit, the peace of mind you get from getting a real diagnosis real time can be priceless. For more information on Vicente [employer], view our Frequently Asked Questions (FAQs) at [email or web address].     Sincerely,    Jayson Rousseau MD  Chief Medical Officer  [facility]     *:  certain medications cannot be prescribed via [employer]         Providers Seen During Your Hospitalization     Provider Specialty Primary office phone    Maria Isabel Dave MD Orthopedic Surgery [phone]      Your Primary Care Physician (PCP)     Primary Care Physician Office Phone Office Fax    NOT ON FILE ** None ** ** None **      You are allergic to the following     Allergen Reactions    Latex Rash         Adhesive Contact Dermatitis         Zofran (Ondansetron Hcl) Nausea and Vomiting      Recent Documentation     Weight BMI OB Status Smoking Status          (!) 170.6 kg 66.61 kg/m2 Having regular periods Never Smoker           Emergency Contacts       Name Discharge Info Relation Home Work Namrata Dao  Other Relative [1] [phone]      Patient Belongings     The following personal items are in your possession at time of discharge:    Dental Appliances: None         Home Medications: None   Jewelry: Earrings, Body Piercing  Clothing: Footwear, Pants, Shirt, Undergarments    Other Valuables: Cell Phone, Other (comment) (identification )              Please provide this summary of care documentation to your next provider. Signatures-by signing, you are acknowledging that this After Visit Summary has been reviewed with you and you have received a copy.                       Patient Signature:  ____________________________________________________________    Date:  ____________________________________________________________      `           Provider Signature:  ____________________________________________________________    Date:  ____________________________________________________________

## 2018-06-11 NOTE — OP NOTES
Operative Report       DOS:  6/4/18    Preoperative diagnosis:  De Quervain's tenosynovitis, right [M65.4]    Postoperative diagnosis: De Quervain's tenosynovitis, right     Surgeon(s) and Role:     * Baltazar Paulino MD - Primary     Anesthesia: MAC Local with MAC. Procedures: Procedure(s):  DEQUERVAINS RELEASE RIGHT     EBL/IV FLUIDS: Per Anesthesia. COMPLICATIONS: None. DISPOSITION: Stable to recovery room. INDICATIONS FOR PROCEDURE: The patient is a pleasant 80-year-old female with a history of right de Quervain that failed conservative measures and wanted to proceed with a release. Risks and benefits of the procedure were discussed with her including, but not limited to bleeding, infection, injury to adjacent structures, need for additional procedures, wound dehiscence, scar formation, incomplete resolution of symptoms, recurrence of symptoms, decreased range of motion, stiffness and pain, as well as the anesthetic risk. PROCEDURE IN DETAIL: The patient was seen and marked in the preoperative suite. She was taken back to the OR, placed on the table in supine position with the right upper extremity on a hand table. Nonsterile tourniquet was placed to bilateral arms. Right upper extremity was prepped and draped in standard sterile fashion. Formal timeout was performed confirming the patient, identification,preoperative antibiotics, as well as planned operative procedure. We exsanguinated the right upper extremity and the tourniquet was placed at 250 mmHg. We infiltrated planned incision sites with lidocaine and Marcaine. A standard slightly volar incision was made, dissected down protecting the radial sensory nerves, identified the first dorsal compartment, released it both proximal and distal in its entirety, there was  a second compartment for the EPB. Both tendons were completely released. We irrigated copiously with normal saline, closed with Prolene and Dermabond glue.  Soft sterile dressing was placed. Tourniquet was let down. Fingers pinked up nicely at the end of the procedure. She tolerated the procedure well. POSTOPERATIVE CARE: Early motion. No heavy lifting.  Followup in 2 weeks for suture removal.    Closure: Primary    Complications: None     Signed By: Raya Mann MD

## 2018-08-22 ENCOUNTER — APPOINTMENT (OUTPATIENT)
Dept: CT IMAGING | Age: 43
End: 2018-08-22
Payer: MEDICARE

## 2018-08-22 ENCOUNTER — HOSPITAL ENCOUNTER (EMERGENCY)
Age: 43
Discharge: HOME | End: 2018-08-23
Payer: MEDICARE

## 2018-08-22 VITALS
DIASTOLIC BLOOD PRESSURE: 77 MMHG | HEART RATE: 84 BPM | HEIGHT: 63 IN | OXYGEN SATURATION: 99 % | BODY MASS INDEX: 51.91 KG/M2 | SYSTOLIC BLOOD PRESSURE: 145 MMHG | RESPIRATION RATE: 18 BRPM | TEMPERATURE: 98 F | WEIGHT: 293 LBS

## 2018-08-22 DIAGNOSIS — N39.0 URINARY TRACT INFECTION WITHOUT HEMATURIA, SITE UNSPECIFIED: Primary | ICD-10-CM

## 2018-08-22 LAB
ALBUMIN SERPL-MCNC: 3.3 G/DL (ref 3.5–5)
ALBUMIN/GLOB SERPL: 0.8 {RATIO} (ref 1.2–3.5)
ALP SERPL-CCNC: 88 U/L (ref 50–136)
ALT SERPL-CCNC: 32 U/L (ref 12–65)
ANION GAP SERPL CALC-SCNC: 5 MMOL/L (ref 7–16)
AST SERPL-CCNC: 30 U/L (ref 15–37)
BACTERIA URNS QL MICRO: ABNORMAL /HPF
BASOPHILS # BLD: 0.1 K/UL
BASOPHILS NFR BLD: 0 % (ref 0–2)
BILIRUB SERPL-MCNC: 0.4 MG/DL (ref 0.2–1.1)
BUN SERPL-MCNC: 9 MG/DL (ref 6–23)
CALCIUM SERPL-MCNC: 8.4 MG/DL (ref 8.3–10.4)
CASTS URNS QL MICRO: ABNORMAL /LPF
CHLORIDE SERPL-SCNC: 105 MMOL/L (ref 98–107)
CO2 SERPL-SCNC: 29 MMOL/L (ref 21–32)
CREAT SERPL-MCNC: 0.94 MG/DL (ref 0.6–1)
DIFFERENTIAL METHOD BLD: ABNORMAL
EOSINOPHIL # BLD: 0.2 K/UL
EOSINOPHIL NFR BLD: 1 % (ref 0.5–7.8)
EPI CELLS #/AREA URNS HPF: 0 /HPF
ERYTHROCYTE [DISTWIDTH] IN BLOOD BY AUTOMATED COUNT: 15.3 %
GLOBULIN SER CALC-MCNC: 4.3 G/DL (ref 2.3–3.5)
GLUCOSE SERPL-MCNC: 86 MG/DL (ref 65–100)
HCG UR QL: NEGATIVE
HCT VFR BLD AUTO: 37.2 % (ref 35.8–46.3)
HGB BLD-MCNC: 11.9 G/DL (ref 11.7–15.4)
IMM GRANULOCYTES # BLD: 0.1 K/UL
IMM GRANULOCYTES NFR BLD AUTO: 0 % (ref 0–5)
LYMPHOCYTES # BLD: 2.6 K/UL
LYMPHOCYTES NFR BLD: 14 % (ref 13–44)
MCH RBC QN AUTO: 27.7 PG (ref 26.1–32.9)
MCHC RBC AUTO-ENTMCNC: 32 G/DL (ref 31.4–35)
MCV RBC AUTO: 86.5 FL (ref 79.6–97.8)
MONOCYTES # BLD: 1.1 K/UL
MONOCYTES NFR BLD: 6 % (ref 4–12)
NEUTS SEG # BLD: 14.2 K/UL
NEUTS SEG NFR BLD: 78 % (ref 43–78)
NRBC # BLD: 0 K/UL (ref 0–0.2)
PLATELET # BLD AUTO: 256 K/UL (ref 150–450)
PMV BLD AUTO: 8.7 FL (ref 9.4–12.3)
POTASSIUM SERPL-SCNC: 3.9 MMOL/L (ref 3.5–5.1)
PROT SERPL-MCNC: 7.6 G/DL (ref 6.3–8.2)
RBC # BLD AUTO: 4.3 M/UL (ref 4.05–5.2)
RBC #/AREA URNS HPF: ABNORMAL /HPF
SODIUM SERPL-SCNC: 139 MMOL/L (ref 136–145)
WBC # BLD AUTO: 18.1 K/UL (ref 4.3–11.1)
WBC URNS QL MICRO: ABNORMAL /HPF

## 2018-08-22 PROCEDURE — 96361 HYDRATE IV INFUSION ADD-ON: CPT

## 2018-08-22 PROCEDURE — 81025 URINE PREGNANCY TEST: CPT

## 2018-08-22 PROCEDURE — 74011636320 HC RX REV CODE- 636/320

## 2018-08-22 PROCEDURE — 74177 CT ABD & PELVIS W/CONTRAST: CPT

## 2018-08-22 PROCEDURE — 80053 COMPREHEN METABOLIC PANEL: CPT

## 2018-08-22 PROCEDURE — 81015 MICROSCOPIC EXAM OF URINE: CPT

## 2018-08-22 PROCEDURE — 74011000258 HC RX REV CODE- 258

## 2018-08-22 PROCEDURE — 99284 EMERGENCY DEPT VISIT MOD MDM: CPT

## 2018-08-22 PROCEDURE — 81003 URINALYSIS AUTO W/O SCOPE: CPT

## 2018-08-22 PROCEDURE — 85025 COMPLETE CBC W/AUTO DIFF WBC: CPT

## 2018-08-22 RX ORDER — SODIUM CHLORIDE 0.9 % (FLUSH) 0.9 %
10 SYRINGE (ML) INJECTION
Status: COMPLETED | OUTPATIENT
Start: 2018-08-22 | End: 2018-08-22

## 2018-08-22 RX ADMIN — Medication 10 ML: at 23:21

## 2018-08-22 RX ADMIN — SODIUM CHLORIDE 100 ML: 900 INJECTION, SOLUTION INTRAVENOUS at 23:21

## 2018-08-22 RX ADMIN — IOPAMIDOL 100 ML: 755 INJECTION, SOLUTION INTRAVENOUS at 23:21

## 2018-08-23 PROCEDURE — 74011250637 HC RX REV CODE- 250/637

## 2018-08-23 PROCEDURE — 74011000258 HC RX REV CODE- 258

## 2018-08-23 PROCEDURE — 74011250636 HC RX REV CODE- 250/636

## 2018-08-23 PROCEDURE — 96375 TX/PRO/DX INJ NEW DRUG ADDON: CPT

## 2018-08-23 PROCEDURE — 96365 THER/PROPH/DIAG IV INF INIT: CPT

## 2018-08-23 RX ORDER — PHENAZOPYRIDINE HYDROCHLORIDE 200 MG/1
200 TABLET, FILM COATED ORAL 3 TIMES DAILY
Qty: 6 TAB | Refills: 0 | Status: SHIPPED | OUTPATIENT
Start: 2018-08-23 | End: 2018-08-25

## 2018-08-23 RX ORDER — AMOXICILLIN AND CLAVULANATE POTASSIUM 875; 125 MG/1; MG/1
1 TABLET, FILM COATED ORAL 2 TIMES DAILY
Qty: 20 TAB | Refills: 0 | Status: SHIPPED | OUTPATIENT
Start: 2018-08-23 | End: 2019-09-25 | Stop reason: CLARIF

## 2018-08-23 RX ORDER — AMOXICILLIN AND CLAVULANATE POTASSIUM 875; 125 MG/1; MG/1
1 TABLET, FILM COATED ORAL 2 TIMES DAILY
Qty: 20 TAB | Refills: 0 | Status: SHIPPED | OUTPATIENT
Start: 2018-08-23 | End: 2018-08-23

## 2018-08-23 RX ORDER — MAGNESIUM CITRATE
296 SOLUTION, ORAL ORAL
Qty: 1 BOTTLE | Refills: 0 | Status: SHIPPED | OUTPATIENT
Start: 2018-08-23 | End: 2019-09-25 | Stop reason: CLARIF

## 2018-08-23 RX ORDER — CEPHALEXIN 500 MG/1
500 CAPSULE ORAL 4 TIMES DAILY
Qty: 28 CAP | Refills: 0 | Status: SHIPPED | OUTPATIENT
Start: 2018-08-23 | End: 2018-08-23

## 2018-08-23 RX ORDER — KETOROLAC TROMETHAMINE 30 MG/ML
30 INJECTION, SOLUTION INTRAMUSCULAR; INTRAVENOUS
Status: COMPLETED | OUTPATIENT
Start: 2018-08-23 | End: 2018-08-23

## 2018-08-23 RX ORDER — TRAMADOL HYDROCHLORIDE 50 MG/1
50 TABLET ORAL
Qty: 12 TAB | Refills: 0 | Status: SHIPPED | OUTPATIENT
Start: 2018-08-23 | End: 2019-09-25 | Stop reason: CLARIF

## 2018-08-23 RX ORDER — PHENAZOPYRIDINE HYDROCHLORIDE 95 MG/1
200 TABLET ORAL
Status: COMPLETED | OUTPATIENT
Start: 2018-08-23 | End: 2018-08-23

## 2018-08-23 RX ADMIN — CEFTRIAXONE 1 G: 1 INJECTION, POWDER, FOR SOLUTION INTRAMUSCULAR; INTRAVENOUS at 00:34

## 2018-08-23 RX ADMIN — KETOROLAC TROMETHAMINE 30 MG: 30 INJECTION, SOLUTION INTRAMUSCULAR at 00:34

## 2018-08-23 RX ADMIN — URINARY PAIN RELIEF 190 MG: 95 TABLET ORAL at 00:33

## 2018-08-23 NOTE — ED NOTES
I have reviewed discharge instructions with the patient. The patient verbalized understanding. Patient left ED via Discharge Method: ambulatory to Home with (insert name of family/friend, self, transport POV). Opportunity for questions and clarification provided. Patient given 3 scripts. Augmentin, Pyridium, and ultram        To continue your aftercare when you leave the hospital, you may receive an automated call from our care team to check in on how you are doing. This is a free service and part of our promise to provide the best care and service to meet your aftercare needs.  If you have questions, or wish to unsubscribe from this service please call [phone]. Thank you for Choosing our [facility].

## 2018-08-23 NOTE — ED PROVIDER NOTES
HPI Comments: 36y     Patient is a 37 y.o. female presenting with abdominal pain. The history is provided by the patient. Abdominal Pain    This is a new problem. The current episode started more than 2 days ago. The problem occurs constantly. The problem has not changed since onset. The pain is located in the RLQ. The quality of the pain is aching. The pain is at a severity of 10/10. The pain is severe. Pertinent negatives include no anorexia, no fever, no belching, no diarrhea, no flatus, no hematochezia, no melena, no nausea, no vomiting and no constipation. Nothing worsens the pain. The pain is relieved by nothing. The patient's surgical history non-contributory. Past Medical History:   Diagnosis Date    Adverse effect of anesthesia     wakes up grumpy    Arthritis     Diabetes ([address]     diet controlled, never taken medication     Hypertension     no medication required at present    Morbid obesity ([address]     BMI 66 with hx of JASVIR    Psychiatric disorder     anxiety; depression    Right wrist pain     Sleep apnea     does not wear a cpap       Past Surgical History:   Procedure Laterality Date    HX GI  2013    gastric bypass, has lost 150    HX ORTHOPAEDIC  2001     left leg 2001 with hardware and ankle    HX ORTHOPAEDIC  1992    left arm fracuter with hardware and C three fracture         Family History:   Problem Relation Age of Onset    Diabetes Mother     Heart Disease Mother      CHF    Diabetes Father     Heart Disease Father        Social History     Social History    Marital status:      Spouse name: N/A    Number of children: N/A    Years of education: N/A     Occupational History    Not on file.      Social History Main Topics    Smoking status: Never Smoker    Smokeless tobacco: Never Used    Alcohol use 1.2 oz/week     1 Glasses of wine, 1 Cans of beer per week      Comment: occasional    Drug use: 1.00 per week     Special: Marijuana    Sexual activity: Yes Birth control/ protection: None     Other Topics Concern    Not on file     Social History Narrative         ALLERGIES: Latex; Adhesive; and Zofran [ondansetron hcl]    Review of Systems   Constitutional: Negative. Negative for activity change and fever. HENT: Negative. Eyes: Negative. Respiratory: Negative. Cardiovascular: Negative. Gastrointestinal: Positive for abdominal pain. Negative for anorexia, constipation, diarrhea, flatus, hematochezia, melena, nausea and vomiting. Genitourinary: Negative. Musculoskeletal: Negative. Skin: Negative. Neurological: Negative. Psychiatric/Behavioral: Negative. All other systems reviewed and are negative. Vitals:    08/22/18 2038   BP: 145/77   Pulse: 84   Resp: 18   Temp: 98 °F (36.7 °C)   SpO2: 99%   Weight: (!) 166.9 kg (368 lb)   Height: 5' 3\" (1.6 m)            Physical Exam   Constitutional: She is oriented to person, place, and time. She appears well-developed and well-nourished. No distress. HENT:   Head: Normocephalic and atraumatic. Right Ear: External ear normal.   Left Ear: External ear normal.   Nose: Nose normal.   Mouth/Throat: Oropharynx is clear and moist. No oropharyngeal exudate. Eyes: Conjunctivae and EOM are normal. Pupils are equal, round, and reactive to light. Right eye exhibits no discharge. Left eye exhibits no discharge. No scleral icterus. Neck: Normal range of motion. Neck supple. No JVD present. No tracheal deviation present. Cardiovascular: Normal rate, regular rhythm and intact distal pulses. Pulmonary/Chest: Effort normal and breath sounds normal. No stridor. No respiratory distress. She has no wheezes. She exhibits no tenderness. Abdominal: Soft. Bowel sounds are normal. She exhibits no distension and no mass. There is no tenderness. There is no rigidity, no guarding, no tenderness at McBurney's point and negative Barrett's sign. Musculoskeletal: Normal range of motion.  She exhibits no edema or tenderness. Neurological: She is alert and oriented to person, place, and time. No cranial nerve deficit. Skin: Skin is warm and dry. No rash noted. She is not diaphoretic. No erythema. No pallor. Psychiatric: She has a normal mood and affect. Her behavior is normal. Thought content normal.   Nursing note and vitals reviewed. MDM  Number of Diagnoses or Management Options  Urinary tract infection without hematuria, site unspecified:   Diagnosis management comments: Assessment UTI and leukocytosis. The patient's pain location and alert and white blood cells in her urine treat her for early pyelonephritis and treat her with Augmentin.        Amount and/or Complexity of Data Reviewed  Clinical lab tests: ordered and reviewed  Tests in the radiology section of CPT®: ordered and reviewed  Tests in the medicine section of CPT®: ordered and reviewed    Risk of Complications, Morbidity, and/or Mortality  Presenting problems: high  Diagnostic procedures: high  Management options: high          ED Course       Procedures

## 2018-08-23 NOTE — DISCHARGE INSTRUCTIONS
Urinary Tract Infection in Women: Care Instructions  Your Care Instructions    A urinary tract infection, or UTI, is a general term for an infection anywhere between the kidneys and the urethra (where urine comes out). Most UTIs are bladder infections. They often cause pain or burning when you urinate. UTIs are caused by bacteria and can be cured with antibiotics. Be sure to complete your treatment so that the infection goes away. Follow-up care is a key part of your treatment and safety. Be sure to make and go to all appointments, and call your doctor if you are having problems. It's also a good idea to know your test results and keep a list of the medicines you take. How can you care for yourself at home? · Take your antibiotics as directed. Do not stop taking them just because you feel better. You need to take the full course of antibiotics. · Drink extra water and other fluids for the next day or two. This may help wash out the bacteria that are causing the infection. (If you have kidney, heart, or liver disease and have to limit fluids, talk with your doctor before you increase your fluid intake.)  · Avoid drinks that are carbonated or have caffeine. They can irritate the bladder. · Urinate often. Try to empty your bladder each time. · To relieve pain, take a hot bath or lay a heating pad set on low over your lower belly or genital area. Never go to sleep with a heating pad in place. To prevent UTIs  · Drink plenty of water each day. This helps you urinate often, which clears bacteria from your system. (If you have kidney, heart, or liver disease and have to limit fluids, talk with your doctor before you increase your fluid intake.)  · Urinate when you need to. · Urinate right after you have sex. · Change sanitary pads often. · Avoid douches, bubble baths, feminine hygiene sprays, and other feminine hygiene products that have deodorants.   · After going to the bathroom, wipe from front to back.  When should you call for help? Call your doctor now or seek immediate medical care if:    · Symptoms such as fever, chills, nausea, or vomiting get worse or appear for the first time.     · You have new pain in your back just below your rib cage. This is called flank pain.     · There is new blood or pus in your urine.     · You have any problems with your antibiotic medicine.    Watch closely for changes in your health, and be sure to contact your doctor if:    · You are not getting better after taking an antibiotic for 2 days.     · Your symptoms go away but then come back. Where can you learn more? Go to http://naz-shelly.info/. Enter H430 in the search box to learn more about \"Urinary Tract Infection in Women: Care Instructions. \"  Current as of: May 12, 2017  Content Version: 11.7  © 5680-1208 [employer]. Care instructions adapted under license by [employer] (which disclaims liability or warranty for this information). If you have questions about a medical condition or this instruction, always ask your healthcare professional. [address] any warranty or liability for your use of this information.

## 2019-09-25 ENCOUNTER — ANESTHESIA EVENT (OUTPATIENT)
Dept: SURGERY | Age: 44
End: 2019-09-25
Payer: MEDICARE

## 2019-09-25 RX ORDER — SODIUM CHLORIDE 0.9 % (FLUSH) 0.9 %
5-40 SYRINGE (ML) INJECTION AS NEEDED
Status: CANCELLED | OUTPATIENT
Start: 2019-09-25

## 2019-09-25 RX ORDER — SODIUM CHLORIDE 0.9 % (FLUSH) 0.9 %
5-40 SYRINGE (ML) INJECTION EVERY 8 HOURS
Status: CANCELLED | OUTPATIENT
Start: 2019-09-25

## 2019-09-26 ENCOUNTER — ANESTHESIA (OUTPATIENT)
Dept: SURGERY | Age: 44
End: 2019-09-26
Payer: MEDICARE

## 2019-09-26 ENCOUNTER — APPOINTMENT (OUTPATIENT)
Dept: GENERAL RADIOLOGY | Age: 44
End: 2019-09-26
Attending: ORTHOPAEDIC SURGERY
Payer: MEDICARE

## 2019-09-26 ENCOUNTER — HOSPITAL ENCOUNTER (OUTPATIENT)
Age: 44
Setting detail: OUTPATIENT SURGERY
Discharge: HOME | End: 2019-09-26
Attending: ORTHOPAEDIC SURGERY | Admitting: ORTHOPAEDIC SURGERY
Payer: MEDICARE

## 2019-09-26 VITALS
OXYGEN SATURATION: 99 % | SYSTOLIC BLOOD PRESSURE: 150 MMHG | HEART RATE: 69 BPM | TEMPERATURE: 98.9 F | RESPIRATION RATE: 16 BRPM | WEIGHT: 293 LBS | BODY MASS INDEX: 63.95 KG/M2 | DIASTOLIC BLOOD PRESSURE: 83 MMHG

## 2019-09-26 LAB
GLUCOSE BLD STRIP.AUTO-MCNC: 82 MG/DL (ref 65–100)
HCG UR QL: NEGATIVE
POTASSIUM BLD-SCNC: 3.9 MMOL/L (ref 3.5–5.1)

## 2019-09-26 PROCEDURE — 77030006788 HC BLD SAW OSC STRY -B: Performed by: ORTHOPAEDIC SURGERY

## 2019-09-26 PROCEDURE — 77030013392 HC CAP PROTCT PIN JRGN -B: Performed by: ORTHOPAEDIC SURGERY

## 2019-09-26 PROCEDURE — 74011250636 HC RX REV CODE- 250/636: Performed by: ORTHOPAEDIC SURGERY

## 2019-09-26 PROCEDURE — 76010010054 HC POST OP PAIN BLOCK: Performed by: ORTHOPAEDIC SURGERY

## 2019-09-26 PROCEDURE — 76210000063 HC OR PH I REC FIRST 0.5 HR: Performed by: ORTHOPAEDIC SURGERY

## 2019-09-26 PROCEDURE — 76060000034 HC ANESTHESIA 1.5 TO 2 HR: Performed by: ORTHOPAEDIC SURGERY

## 2019-09-26 PROCEDURE — 77030006689 HC BLD OPHTH BVR BD -A: Performed by: ORTHOPAEDIC SURGERY

## 2019-09-26 PROCEDURE — 84132 ASSAY OF SERUM POTASSIUM: CPT

## 2019-09-26 PROCEDURE — 77030002933 HC SUT MCRYL J&J -A: Performed by: ORTHOPAEDIC SURGERY

## 2019-09-26 PROCEDURE — 77030002912 HC SUT ETHBND J&J -A: Performed by: ORTHOPAEDIC SURGERY

## 2019-09-26 PROCEDURE — 74011250636 HC RX REV CODE- 250/636: Performed by: ANESTHESIOLOGY

## 2019-09-26 PROCEDURE — 82962 GLUCOSE BLOOD TEST: CPT

## 2019-09-26 PROCEDURE — 77030008593 HC TRAP FNGR MESH ALLN -B: Performed by: ORTHOPAEDIC SURGERY

## 2019-09-26 PROCEDURE — 77030029883 HC RETRV SUT ARTHSCP HOFFE BEAT -B: Performed by: ORTHOPAEDIC SURGERY

## 2019-09-26 PROCEDURE — 76010010054 HC POST OP PAIN BLOCK

## 2019-09-26 PROCEDURE — 77030039266 HC ADH SKN EXOFIN S2SG -A: Performed by: ORTHOPAEDIC SURGERY

## 2019-09-26 PROCEDURE — 76942 ECHO GUIDE FOR BIOPSY: CPT | Performed by: ORTHOPAEDIC SURGERY

## 2019-09-26 PROCEDURE — C1713 ANCHOR/SCREW BN/BN,TIS/BN: HCPCS | Performed by: ORTHOPAEDIC SURGERY

## 2019-09-26 PROCEDURE — 77030002916 HC SUT ETHLN J&J -A: Performed by: ORTHOPAEDIC SURGERY

## 2019-09-26 PROCEDURE — 77030000032 HC CUF TRNQT ZIMM -B: Performed by: ORTHOPAEDIC SURGERY

## 2019-09-26 PROCEDURE — 77030003602 HC NDL NRV BLK BBMI -B: Performed by: ANESTHESIOLOGY

## 2019-09-26 PROCEDURE — A4565 SLINGS: HCPCS | Performed by: ORTHOPAEDIC SURGERY

## 2019-09-26 PROCEDURE — 74011250636 HC RX REV CODE- 250/636

## 2019-09-26 PROCEDURE — 81025 URINE PREGNANCY TEST: CPT

## 2019-09-26 PROCEDURE — 77030018836 HC SOL IRR NACL ICUM -A: Performed by: ORTHOPAEDIC SURGERY

## 2019-09-26 PROCEDURE — 76010000162 HC OR TIME 1.5 TO 2 HR INTENSV-TIER 1: Performed by: ORTHOPAEDIC SURGERY

## 2019-09-26 PROCEDURE — 77030002986 HC SUT PROL J&J -A: Performed by: ORTHOPAEDIC SURGERY

## 2019-09-26 PROCEDURE — 76210000026 HC REC RM PH II 1 TO 1.5 HR: Performed by: ORTHOPAEDIC SURGERY

## 2019-09-26 DEVICE — WIRE K 2 DMND 1.6MMX22.9CM SS --: Type: IMPLANTABLE DEVICE | Site: HAND | Status: FUNCTIONAL

## 2019-09-26 RX ORDER — CEFAZOLIN SODIUM/WATER 2 G/20 ML
2 SYRINGE (ML) INTRAVENOUS ONCE
Status: DISCONTINUED | OUTPATIENT
Start: 2019-09-26 | End: 2019-09-26 | Stop reason: DRUGHIGH

## 2019-09-26 RX ORDER — SODIUM CHLORIDE 0.9 % (FLUSH) 0.9 %
5-40 SYRINGE (ML) INJECTION EVERY 8 HOURS
Status: DISCONTINUED | OUTPATIENT
Start: 2019-09-26 | End: 2019-09-26 | Stop reason: HOSPADM

## 2019-09-26 RX ORDER — OXYCODONE HYDROCHLORIDE 5 MG/1
5 TABLET ORAL
Status: DISCONTINUED | OUTPATIENT
Start: 2019-09-26 | End: 2019-09-26 | Stop reason: HOSPADM

## 2019-09-26 RX ORDER — SODIUM CHLORIDE, SODIUM LACTATE, POTASSIUM CHLORIDE, CALCIUM CHLORIDE 600; 310; 30; 20 MG/100ML; MG/100ML; MG/100ML; MG/100ML
75 INJECTION, SOLUTION INTRAVENOUS CONTINUOUS
Status: DISCONTINUED | OUTPATIENT
Start: 2019-09-26 | End: 2019-09-26 | Stop reason: HOSPADM

## 2019-09-26 RX ORDER — MIDAZOLAM HYDROCHLORIDE 1 MG/ML
2 INJECTION, SOLUTION INTRAMUSCULAR; INTRAVENOUS
Status: DISCONTINUED | OUTPATIENT
Start: 2019-09-26 | End: 2019-09-26 | Stop reason: HOSPADM

## 2019-09-26 RX ORDER — PROPOFOL 10 MG/ML
INJECTION, EMULSION INTRAVENOUS
Status: DISCONTINUED | OUTPATIENT
Start: 2019-09-26 | End: 2019-09-26 | Stop reason: HOSPADM

## 2019-09-26 RX ORDER — PROPOFOL 10 MG/ML
INJECTION, EMULSION INTRAVENOUS AS NEEDED
Status: DISCONTINUED | OUTPATIENT
Start: 2019-09-26 | End: 2019-09-26 | Stop reason: HOSPADM

## 2019-09-26 RX ORDER — HYDROMORPHONE HYDROCHLORIDE 2 MG/ML
0.5 INJECTION, SOLUTION INTRAMUSCULAR; INTRAVENOUS; SUBCUTANEOUS
Status: DISCONTINUED | OUTPATIENT
Start: 2019-09-26 | End: 2019-09-26 | Stop reason: HOSPADM

## 2019-09-26 RX ORDER — GLYCOPYRROLATE 0.2 MG/ML
INJECTION INTRAMUSCULAR; INTRAVENOUS AS NEEDED
Status: DISCONTINUED | OUTPATIENT
Start: 2019-09-26 | End: 2019-09-26 | Stop reason: HOSPADM

## 2019-09-26 RX ORDER — FENTANYL CITRATE 50 UG/ML
100 INJECTION, SOLUTION INTRAMUSCULAR; INTRAVENOUS ONCE
Status: COMPLETED | OUTPATIENT
Start: 2019-09-26 | End: 2019-09-26

## 2019-09-26 RX ORDER — KETAMINE HYDROCHLORIDE 50 MG/ML
INJECTION, SOLUTION INTRAMUSCULAR; INTRAVENOUS AS NEEDED
Status: DISCONTINUED | OUTPATIENT
Start: 2019-09-26 | End: 2019-09-26 | Stop reason: HOSPADM

## 2019-09-26 RX ORDER — SODIUM CHLORIDE 0.9 % (FLUSH) 0.9 %
5-40 SYRINGE (ML) INJECTION AS NEEDED
Status: DISCONTINUED | OUTPATIENT
Start: 2019-09-26 | End: 2019-09-26 | Stop reason: HOSPADM

## 2019-09-26 RX ORDER — ACETAMINOPHEN 10 MG/ML
1000 INJECTION, SOLUTION INTRAVENOUS
Status: COMPLETED | OUTPATIENT
Start: 2019-09-26 | End: 2019-09-26

## 2019-09-26 RX ORDER — NALOXONE HYDROCHLORIDE 0.4 MG/ML
0.2 INJECTION, SOLUTION INTRAMUSCULAR; INTRAVENOUS; SUBCUTANEOUS
Status: DISCONTINUED | OUTPATIENT
Start: 2019-09-26 | End: 2019-09-26 | Stop reason: SDUPTHER

## 2019-09-26 RX ORDER — MIDAZOLAM HYDROCHLORIDE 1 MG/ML
2 INJECTION, SOLUTION INTRAMUSCULAR; INTRAVENOUS ONCE
Status: COMPLETED | OUTPATIENT
Start: 2019-09-26 | End: 2019-09-26

## 2019-09-26 RX ORDER — LIDOCAINE HYDROCHLORIDE 10 MG/ML
0.1 INJECTION INFILTRATION; PERINEURAL AS NEEDED
Status: DISCONTINUED | OUTPATIENT
Start: 2019-09-26 | End: 2019-09-26 | Stop reason: HOSPADM

## 2019-09-26 RX ORDER — ROPIVACAINE HYDROCHLORIDE 5 MG/ML
INJECTION, SOLUTION EPIDURAL; INFILTRATION; PERINEURAL
Status: COMPLETED | OUTPATIENT
Start: 2019-09-26 | End: 2019-09-26

## 2019-09-26 RX ORDER — SODIUM CHLORIDE, SODIUM LACTATE, POTASSIUM CHLORIDE, CALCIUM CHLORIDE 600; 310; 30; 20 MG/100ML; MG/100ML; MG/100ML; MG/100ML
25 INJECTION, SOLUTION INTRAVENOUS CONTINUOUS
Status: DISCONTINUED | OUTPATIENT
Start: 2019-09-26 | End: 2019-09-26 | Stop reason: HOSPADM

## 2019-09-26 RX ORDER — ONDANSETRON 2 MG/ML
4 INJECTION INTRAMUSCULAR; INTRAVENOUS
Status: DISCONTINUED | OUTPATIENT
Start: 2019-09-26 | End: 2019-09-26 | Stop reason: HOSPADM

## 2019-09-26 RX ORDER — NALOXONE HYDROCHLORIDE 0.4 MG/ML
0.2 INJECTION, SOLUTION INTRAMUSCULAR; INTRAVENOUS; SUBCUTANEOUS AS NEEDED
Status: DISCONTINUED | OUTPATIENT
Start: 2019-09-26 | End: 2019-09-26 | Stop reason: HOSPADM

## 2019-09-26 RX ADMIN — FENTANYL CITRATE 100 MCG: 50 INJECTION INTRAMUSCULAR; INTRAVENOUS at 08:42

## 2019-09-26 RX ADMIN — PROPOFOL 50 MG: 10 INJECTION, EMULSION INTRAVENOUS at 09:32

## 2019-09-26 RX ADMIN — KETAMINE HYDROCHLORIDE 20 MG: 50 INJECTION, SOLUTION INTRAMUSCULAR; INTRAVENOUS at 10:08

## 2019-09-26 RX ADMIN — KETAMINE HYDROCHLORIDE 30 MG: 50 INJECTION, SOLUTION INTRAMUSCULAR; INTRAVENOUS at 09:32

## 2019-09-26 RX ADMIN — SODIUM CHLORIDE, SODIUM LACTATE, POTASSIUM CHLORIDE, AND CALCIUM CHLORIDE 25 ML/HR: 600; 310; 30; 20 INJECTION, SOLUTION INTRAVENOUS at 08:30

## 2019-09-26 RX ADMIN — KETAMINE HYDROCHLORIDE 30 MG: 50 INJECTION, SOLUTION INTRAMUSCULAR; INTRAVENOUS at 09:53

## 2019-09-26 RX ADMIN — MIDAZOLAM HYDROCHLORIDE 2 MG: 2 INJECTION, SOLUTION INTRAMUSCULAR; INTRAVENOUS at 08:42

## 2019-09-26 RX ADMIN — KETAMINE HYDROCHLORIDE 20 MG: 50 INJECTION, SOLUTION INTRAMUSCULAR; INTRAVENOUS at 09:38

## 2019-09-26 RX ADMIN — GLYCOPYRROLATE 0.4 MG: 0.2 INJECTION INTRAMUSCULAR; INTRAVENOUS at 09:30

## 2019-09-26 RX ADMIN — PROPOFOL 100 MCG/KG/MIN: 10 INJECTION, EMULSION INTRAVENOUS at 09:32

## 2019-09-26 RX ADMIN — CEFAZOLIN 3 G: 1 INJECTION, POWDER, FOR SOLUTION INTRAVENOUS at 09:26

## 2019-09-26 RX ADMIN — ROPIVACAINE HYDROCHLORIDE 30 ML: 5 INJECTION, SOLUTION EPIDURAL; INFILTRATION; PERINEURAL at 08:45

## 2019-09-26 RX ADMIN — KETAMINE HYDROCHLORIDE 20 MG: 50 INJECTION, SOLUTION INTRAMUSCULAR; INTRAVENOUS at 10:34

## 2019-09-26 RX ADMIN — ACETAMINOPHEN 1000 MG: 10 INJECTION, SOLUTION INTRAVENOUS at 12:38

## 2019-09-26 NOTE — DISCHARGE INSTRUCTIONS
Keep splint clean, dry and intact until seen in office. Move fingers, elevate, and ice to prevent swelling. No lifting. Sling until block wears off. ACTIVITY  · As tolerated and as directed by your doctor. · Bathe or shower as directed by your doctor. DIET  · Clear liquids until no nausea or vomiting; then light diet for the first day. · Advance to regular diet on second day, unless your doctor orders otherwise. · If nausea and vomiting continues, call your doctor. PAIN  · Take pain medication as directed by your doctor. · Call your doctor if pain is NOT relieved by medication. · DO NOT take aspirin of blood thinners unless directed by your doctor. CALL YOUR DOCTOR IF   · Excessive bleeding that does not stop after holding pressure over the area  · Temperature of 101 degrees F or above  · Excessive redness, swelling or bruising, and/ or green or yellow, smelly discharge from incision    AFTER ANESTHESIA   · For the first 24 hours: DO NOT Drive, Drink alcoholic beverages, or Make important decisions. · Be aware of dizziness following anesthesia and while taking pain medication. DISCHARGE SUMMARY from Nurse    PATIENT INSTRUCTIONS:    After general anesthesia or intravenous sedation, for 24 hours or while taking prescription Narcotics:  · Limit your activities  · Do not drive and operate hazardous machinery  · Do not make important personal or business decisions  · Do  not drink alcoholic beverages  · If you have not urinated within 8 hours after discharge, please contact your surgeon on call. *  Please give a list of your current medications to your Primary Care Provider. *  Please update this list whenever your medications are discontinued, doses are      changed, or new medications (including over-the-counter products) are added. *  Please carry medication information at all times in case of emergency situations.       These are general instructions for a healthy lifestyle:    No smoking/ No tobacco products/ Avoid exposure to second hand smoke    Surgeon General's Warning:  Quitting smoking now greatly reduces serious risk to your health. Obesity, smoking, and sedentary lifestyle greatly increases your risk for illness    A healthy diet, regular physical exercise & weight monitoring are important for maintaining a healthy lifestyle    You may be retaining fluid if you have a history of heart failure or if you experience any of the following symptoms:  Weight gain of 3 pounds or more overnight or 5 pounds in a week, increased swelling in our hands or feet or shortness of breath while lying flat in bed. Please call your doctor as soon as you notice any of these symptoms; do not wait until your next office visit. Recognize signs and symptoms of STROKE:    F-face looks uneven    A-arms unable to move or move unevenly    S-speech slurred or non-existent    T-time-call 911 as soon as signs and symptoms begin-DO NOT go       Back to bed or wait to see if you get better-TIME IS BRAIN.

## 2019-09-26 NOTE — ANESTHESIA POSTPROCEDURE EVALUATION
Procedure(s):  THUMB ARTHROPLASTY RIGHT/ PLEXUS.    total IV anesthesia    Anesthesia Post Evaluation      Multimodal analgesia: multimodal analgesia used between 6 hours prior to anesthesia start to PACU discharge  Patient location during evaluation: bedside  Patient participation: complete - patient participated  Level of consciousness: awake and alert  Pain score: 1  Pain management: adequate  Airway patency: patent  Anesthetic complications: no  Cardiovascular status: acceptable  Respiratory status: acceptable  Hydration status: acceptable  Comments: Pt doing well. Ok to d/c home. Post anesthesia nausea and vomiting:  none      Vitals Value Taken Time   /79 9/26/2019 11:26 AM   Temp 37.2 °C (98.9 °F) 9/26/2019 11:08 AM   Pulse 54 9/26/2019 11:29 AM   Resp 16 9/26/2019 11:21 AM   SpO2 100 % 9/26/2019 11:29 AM   Vitals shown include unvalidated device data.

## 2019-09-26 NOTE — ANESTHESIA PROCEDURE NOTES
Peripheral Block    Start time: 9/26/2019 8:42 AM  End time: 9/26/2019 8:44 AM  Performed by: Marilou Arguelles MD  Authorized by: Marilou Arguelles MD       Pre-procedure:    Indications: at surgeon's request and post-op pain management    Preanesthetic Checklist: patient identified, risks and benefits discussed, site marked, timeout performed, anesthesia consent given and patient being monitored    Timeout Time: 08:42          Block Type:   Block Type:  Supraclavicular  Laterality:  Right  Monitoring:  Standard ASA monitoring, responsive to questions, continuous pulse ox, oxygen, frequent vital sign checks and heart rate  Injection Technique:  Single shot  Procedures: ultrasound guided and nerve stimulator    Patient Position: supine  Prep: chlorhexidine    Location:  Supraclavicular  Needle Type:  Stimuplex  Needle Gauge:  22 G  Needle Localization:  Nerve stimulator and ultrasound guidance  Motor Response: minimal motor response >0.4 mA      Assessment:  Number of attempts:  1  Injection Assessment:  Incremental injection every 5 mL, negative aspiration for CSF, ultrasound image on chart, no paresthesia, local visualized surrounding nerve on ultrasound, negative aspiration for blood and no intravascular symptoms  Patient tolerance:  Patient tolerated the procedure well with no immediate complications

## 2019-09-26 NOTE — PERIOP NOTES
[id number]-Ny c/o pain \"5\" right thumb, kevon ordered, waiting on pharmacy to release, pt stated needed to void, placed on bedpan, voided large amount of clear yeloow, cleansed and pt requested to get dressed, done and then IV reconnected to tubing and primary LR at [facility], [place] hung see emar. Pt sitting up in wheelchair as kevon infuses.

## 2019-09-26 NOTE — ANESTHESIA PROCEDURE NOTES
Peripheral Block    Start time: 9/26/2019 9:08 AM  End time: 9/26/2019 9:14 AM  Performed by: Enriqueta Hernandez MD  Authorized by: Enriqueta Hernandez MD       Pre-procedure:    Indications: at surgeon's request and post-op pain management    Preanesthetic Checklist: patient identified, risks and benefits discussed, site marked, timeout performed, anesthesia consent given and patient being monitored    Timeout Time: 09:08          Block Type:   Block Type:  Supraclavicular  Laterality:  Right  Monitoring:  Standard ASA monitoring, responsive to questions, continuous pulse ox, oxygen, frequent vital sign checks and heart rate  Injection Technique:  Single shot  Procedures: ultrasound guided    Patient Position: supine  Prep: chlorhexidine    Location:  Supraclavicular  Needle Type:  Stimuplex  Needle Gauge:  22 G  Needle Localization:  Ultrasound guidance    Assessment:  Number of attempts:  1  Injection Assessment:  Incremental injection every 5 mL, negative aspiration for CSF, ultrasound image on chart, no paresthesia, local visualized surrounding nerve on ultrasound, negative aspiration for blood and no intravascular symptoms  Patient tolerance:  Patient tolerated the procedure well with no immediate complications

## 2019-09-26 NOTE — BRIEF OP NOTE
BRIEF OPERATIVE NOTE    Date of Procedure: 9/26/2019   Preoperative Diagnosis: Osteoarthritis of right thumb [M18.11]  Postoperative Diagnosis: Osteoarthritis of right thumb [M18.11]    Procedure(s):  THUMB ARTHROPLASTY RIGHT/ PLEXUS  RIGHT THUMB APL LIGAMENT RECONSTRUCTION  Surgeon(s) and Role:     * Bhupinder Gray MD - Primary         Surgical Assistant: KENTON    Surgical Staff:  Circ-1: Harmeet Guzman RN  Scrub Tech-1: Sarah Yung  Event Time In Time Out   Incision Start 8738    Incision Close 1101      Anesthesia: Regional   Estimated Blood Loss: MINIMAL  Specimens: * No specimens in log *   Findings: SEE DICTATION   Complications: NONE  Implants:   Implant Name Type Inv.  Item Serial No.  Lot No. LRB No. Used Action   WIRE K 2 DMND 1.6MMX22.9CM SS --  - [id number]  WIRE K 2 DMND 1.6MMX22.9CM SS --   [employer] [id number] Right 1 Implanted

## 2019-09-26 NOTE — ANESTHESIA PREPROCEDURE EVALUATION
Anesthetic History   No history of anesthetic complications            Review of Systems / Medical History  Patient summary reviewed and pertinent labs reviewed    Pulmonary        Sleep apnea: No treatment           Neuro/Psych         Psychiatric history (Depression/Anxiety)     Cardiovascular    Hypertension              Exercise tolerance: >4 METS  Comments: Denies recent CP, SOB or Palpitations   GI/Hepatic/Renal     GERD: well controlled           Endo/Other    Diabetes: well controlled, type 2    Morbid obesity (Supramorbid obesity) and arthritis     Other Findings              Physical Exam    Airway  Mallampati: II  TM Distance: > 6 cm  Neck ROM: normal range of motion, short neck   Mouth opening: Normal     Cardiovascular  Regular rate and rhythm,  S1 and S2 normal,  no murmur, click, rub, or gallop  Rhythm: regular  Rate: normal         Dental  No notable dental hx       Pulmonary  Breath sounds clear to auscultation               Abdominal  GI exam deferred       Other Findings            Anesthetic Plan    ASA: 3  Anesthesia type: total IV anesthesia      Post-op pain plan if not by surgeon: peripheral nerve block single    Induction: Intravenous  Anesthetic plan and risks discussed with: Patient      Discussed extensively risks and benefits of deep sedation vs. MAC with local vs. GETA with patient.     Plan for sedation

## 2019-10-03 NOTE — OP NOTES
Operative Report       DOS:  9/26/19  Preoperative diagnosis:  Osteoarthritis of right thumb [M18.11]    Postoperative diagnosis: Osteoarthritis of right thumb [M18.11]    Surgeon(s) and Role:     * Swati San MD - Primary     Anesthesia: Regional Local with MAC. Procedures: Procedure(s):  Right thumb CMC arthroplasty with excision of trapezium  Right thumb APL ligament reconstruction       EBL/IV FLUIDS: Per Anesthesia. COMPLICATIONS: None. DISPOSITION: Stable to recovery room. INDICATIONS FOR PROCEDURE: The patient is a pleasant 54-year-old female with history of right thumb arthritis that has failed nonoperative measures. Risks and benefits of the procedure were discussed including but not limited to bleeding, infection, injury to adjacent structures , consisting of tendon, artery or nerve, need for additional procedures, wound dehiscence, scar formation, incomplete resolution of symptoms, recurrence of symptoms, transient neurapraxia, decreased range of motion, hypersensitivity, recurrence of deformity, pin tract infection, malunion, nonunion, failure of hardware, need for removal of hardware, irritation of tendon, artery, decreased range of motion, CMC subsidence, stiffness, pain, as well as anesthetic risk. Informed consent was obtained. PROCEDURE IN DETAIL: The patient was seen and marked in the preoperative suite. The patient was taken back to the OR, placed on the table in supine position with right upper extremities on hand tables. Right upper extremities were prepped and draped in standard sterile fashion. A formal timeout was performed confirming patient identification, preoperative antibiotics, and planned operative procedure. We exsanguinated the right upper extremity tourniquet was placed 250 mmHg. We performed the first portion of the procedure the right thumb trapeziectomy/arthroplasty.   Standard incision was made over the right thumb CMC to protect the cutaneous nerves and performed a complete trapeziectomy and irrigated copiously with normal saline. Identified STT joint and there was no evidence or bone-on-bone arthritis. The articular surface looked very good. So we did not perform a trapezoidectomy. Finally we performed the APL ligament reconstruction. We harvested the APL in standard manner. We drilled and passed the tendon through the base of the thumb metacarpal and utilizing a third incision passed it through the index metacarpal drill hole tied it back to itself as a Pulvertaft weave. We irrigated copiously with saline. A pin was placed into thethumb to hold it out to length suspended nicely. We closed the subcutaneous Monocryl and a running subcuticular Prolene and Dermabond glue. A soft sterile dressing was placed tourniquet was let down fingers pinked up nicely we placed into a thumb spica splint. Patient was taken to recovery room having told procedure well. POSTOPERATIVE CARE: Early motion. No heavy lifting.  Followup in 2 weeks for suture removal.      Closure: Primary    Complications: None     Signed By: Ronny Lance MD

## 2019-10-08 ENCOUNTER — HOSPITAL ENCOUNTER (OUTPATIENT)
Dept: PHYSICAL THERAPY | Age: 44
Discharge: HOME | End: 2019-10-08
Payer: MEDICARE

## 2019-10-08 PROCEDURE — 97165 OT EVAL LOW COMPLEX 30 MIN: CPT

## 2019-10-08 PROCEDURE — 97760 ORTHOTIC MGMT&TRAING 1ST ENC: CPT

## 2019-10-09 NOTE — THERAPY EVALUATION
Garcia Higuera  : 1975  Primary: [address] Medic*  [place] [facility] at [address], [address], [place] [address].  [phone]   [phone]          OUTPATIENT OCCUPATIONAL [place] Assessment 10/8/2019   ICD-10: Treatment Diagnosis: Stiffness of right hand, not elsewhere classified (M25.641)Pain in right hand (M79.641)  Precautions/Allergies:   Latex; Adhesive; and Zofran [ondansetron hcl]   TREATMENT PLAN:  Effective Dates: 10/8/2019 TO 2020 (90 days). Frequency/Duration: 1-2 times a week for 90 Day(s)  ( ONCE PIN IS REMOVED ) MEDICAL/REFERRING DIAGNOSIS:  Unilateral primary osteoarthritis of first carpometacarpal joint, right hand [M18.11]   DATE OF ONSET: 1 year ago  REFERRING PHYSICIAN: Joycelyn Mahmood MD MD Orders: Splint to go around pin  Return MD Appointment: 2 weeks     INITIAL ASSESSMENT:   Ms. Nida Montanez presents with decreased functional use, strength and range of motion of her right hand and upper extremity that is affecting her independence with activities of daily living and ability to perform job tasks. I feel that Ms. Wise will benefit from skilled occupational therapy to maximize the functional use of her hand and upper extremity in daily activities . PROBLEM LIST (Impacting functional limitations):  1. Pain in right hand. 2. Decreased motion in right hand. 3. Decreased strength in right hand. INTERVENTIONS PLANNED: (Treatment may consist of any combination of the following)  1. Modalities that may include fluidotherapy, paraffin, ultrasound, and light therapy. 2. Therapeutic exercise including a home exercise program.  3. Manual therapy. 4. Therapeutic activities. GOALS: (Goals have been discussed and agreed upon with patient.)  Short-Term Functional Goals: Time Frame: 4 weeks  1.  Decrease pain to 5 to allow patient to perform self care tasks.  2. Increase motion in right hand by 20 degrees to improve functional use of upper extremity in ADL activities. 3. Increase strength in right hand by 10 pounds to allow patient to  and lift objects during self care activities. Discharge Goals: Time Frame: 12 weeks  1. Decrease pain to 3 to allow patient to perform all household tasks. .  2. Increase motion in hand by 30 degreees to allow patient to perform all ADL activities. 3. Increase strength in right hand by 20 pounds to allow patient to , lift, hold, and carry heavy objects. OUTCOME MEASURE:   Tool Used: Disabilities of the Arm, Shoulder and Hand (DASH) Questionnaire - Quick Version  Score:  Initial: 46/55  Most Recent: X/55 (Date: -- )   Interpretation of Score: The DASH is designed to measure the activities of daily living in person's with upper extremity dysfunction or pain. Each section is scored on a 1-5 scale, 5 representing the greatest disability. The scores of each section are added together for a total score of 55. MEDICAL NECESSITY:   · Patient is expected to demonstrate progress in strength and range of motion to increase independence with ADL and household activities. REASON FOR SERVICES/OTHER COMMENTS:  · The patient has limited motion, strength and function in her right hand. .  Total Duration:  OT Patient Time In/Time Out  Time In: 0315  Time Out: 0400    Rehabilitation Potential For Stated Goals: Good  Regarding Peyman Wise's therapy, I certify that the treatment plan above will be carried out by a therapist or under their direction. Thank you for this referral,  Patricia Mas OT     Referring Physician Signature: Bruce Goldberg MD _______________________________ Date _____________     PAIN/SUBJECTIVE:   Initial: Pain Intensity 1: 8  Pain Location 1: Hand  Pain Orientation 1: Right  Post Session:  8/10   OCCUPATIONAL PROFILE & HISTORY:   History of Injury/Illness (Reason for Referral):   The patient has had increasing arthritic pain in her right thumb for over a year. Past Medical History/Comorbidities:   Ms. Stefanie Hollis  has a past medical history of Adverse effect of anesthesia, Arthritis, Chronic thumb pain, right, Diabetes ([address] GERD (gastroesophageal reflux disease), Hypertension, Morbid obesity ([address] Psychiatric disorder, Right wrist pain, and Sleep apnea. She also has no past medical history of COPD, Dementia, Endocrine disease, Gastrointestinal disorder, Infectious disease, Neurological disorder, Other ill-defined conditions(799.89), or Sleep disorder. Ms. Stefanie Hollis  has a past surgical history that includes hx gi (2013); hx gastric bypass (2013); hx lap cholecystectomy (2013); hx orthopaedic (2001); hx orthopaedic (1992); and hx orthopaedic (2018). Social History/Living Environment:   Home Environment: Private residence  Prior Level of Function/Work/Activity:  Independent  Dominant Side:         RIGHT   Ambulatory/Rehab Services H2 Model Falls Risk Assessment   Risk Factors:       No Risk Factors Identified Ability to Rise from Chair:       (0)  Ability to rise in a single movement   Falls Prevention Plan:       No modifications necessary   Total: (5 or greater = High Risk): 0   ©2010 [facility] of [employer]. All Rights Reserved. [facility] Patent #[id number]. Federal Law prohibits the replication, distribution or use without written permission from [facility] [employer]   Current Medications:       Current Outpatient Medications:     omeprazole (PRILOSEC) 20 mg capsule, Take 20 mg by mouth daily. , Disp: , Rfl:     FLUoxetine (PROZAC) 20 mg tablet, Take 20 mg by mouth nightly., Disp: , Rfl:     hydrOXYzine HCl (ATARAX) 25 mg tablet, Take 25 mg by mouth nightly., Disp: , Rfl:     traMADol (ULTRAM) 50 mg tablet, Take 1 Tab by mouth every six (6) hours as needed for Pain.  Max Daily Amount: 200 mg., Disp: 20 Tab, Rfl: 0    traZODone (DESYREL) 50 mg tablet, Take 100 mg by mouth nightly., Disp: , Rfl:     AZO CRANBERRY 450-30-50 mg-mg-million tab, Take  by mouth., Disp: , Rfl:     folic acid 473 mcg tablet, Take 800 mcg by mouth daily. , Disp: , Rfl:     loratadine-pseudoephedrine (CLARITIN-D 12 HOUR) 5-120 mg per tablet, Take 1 Tab by mouth two (2) times a day., Disp: 30 Tab, Rfl: 1    calcium 500 mg tab, Take  by mouth., Disp: , Rfl:     Cholecalciferol, Vitamin D3, (VITAMIN D3) 1,000 unit cap, Take  by mouth., Disp: , Rfl:     fluticasone (FLONASE) 50 mcg/actuation nasal spray, nightly., Disp: , Rfl:     acyclovir (ZOVIRAX) 200 mg capsule, Take 200 mg by mouth three (3) times daily as needed. , Disp: , Rfl:     Biotin 2,500 mcg cap, Take 10,000 mcg by mouth., Disp: , Rfl:     nystatin (MYCOSTATIN) powder, Apply  to affected area four (4) times daily. , Disp: , Rfl:     promethazine (PHENERGAN) 25 mg tablet, Take 25 mg by mouth every six (6) hours as needed for Nausea., Disp: , Rfl:     POTASSIUM GLUCONATE PO, Take  by mouth., Disp: , Rfl:    Date Last Reviewed:  10/8/2019   Complexity Level: Brief history (0):  LOW COMPLEXITY   ASSESSMENT OF OCCUPATIONAL PERFORMANCE:   RANGE OF MOTION:     · AROM: The patient has limited motion in her right hand and wrist. Measurements not taken due to recent surgery. STRENGTH:  Not tested yet. SENSATION:  The patient reports numbness and tingling in her right hand. Physical Skills Involved:  1. Range of Motion  2. Strength  3. Sensation  4. Pain (acute)  5. Edema Cognitive Skills Affected (resulting in the inability to perform in a timely and safe manner):   1. none Psychosocial Skills Affected:  1. none   Number of elements that affect the Plan of Care[de-identified] 5+:  HIGH COMPLEXITY   CLINICAL DECISION MAKING:   Clinical Decision-Making Assessment:     Assessment process, impact of co-morbidities, assessment modification\need for assistance, and selection of interventions: Analytical Complexity:MODERATE COMPLEXITY

## 2019-10-09 NOTE — PROGRESS NOTES
Timo Zurita  : 1975  Primary: [address] Medic*  [place] [facility] at [place] [id number] [facility], [address], [place] [address].  [phone]   [phone]       OUTPATIENT OCCUPATIONAL THERAPY: Daily Treatment Note 10/8/2019  Visit Count:  1    ICD-10: Treatment Diagnosis: Stiffness of right hand, not elsewhere classified (M25.641)Pain in right hand (M79.641)  Precautions/Allergies:   Latex; Adhesive; and Zofran [ondansetron hcl]   TREATMENT PLAN:  Effective Dates: 10/8/2019 TO 2020 (90 days).   Frequency/Duration: 1-2 times a week for 90 Day(s)  ( ONCE PIN IS REMOVED   Pre-treatment Symptoms/Complaints:  Pain and stiffness in right hand  Pain: Initial: Pain Intensity 1: 8  Pain Location 1: Hand  Pain Orientation 1: Right  Post Session:  8/10   Medications Last Reviewed:  10/8/2019  Updated Objective Findings:  See evaluation note from today   TREATMENT:       Manual Therapy: ( ):     Therapeutic Exercise:                                                                               :                                                 Date:  10/8/19 Date: Date: Date: Date:   Activity/Exercise Parameters Parameters Parameters Parameters Parameters   AROM during Fluidotherapy        Paraffin with Stretch          Retrograde massage, Friction Scar massage, Joint Mobilization          Scarf Curl          Washer Game        Individual Gripper          Hand Cooks          Cones          Pegs          Clothes Pins          A-R Bar          Exerstick          Velcro-Roll                  RESISTIVE EXERCISES Weight/ Sets/Reps   Weight/ Sets/Reps Weight/ Sets/Reps Weight/ Sets/Reps Weight/ Sets/Reps   WEIGHT WELL        Sup/Pro        UD/RD        Wrist Flex/Ext        Free Weights          UBE(Minutes)          Nautilus        Compound Row        Vertical Chest        [employer]                    HEP: As above; handouts given to patient for all exercises. Therapeutic Modalities:                                               Joint Mobilization:        Treatment Times:  · Therapeutic Exercise:  minutes  · Manual Therapy:  minutes  · Parafin:  minutes  · Whirlpool:  minutes  · Other: Orthotic man/traing 30  Minutes ( patient was provided with a fabricated forearm splint )    Treatment/Session Summary:    · Response to Treatment:  Patients tolerated treatment well with no complications. Upon completion of treatment, skin condition was normal.  · Communication/Consultation:  None today  · Equipment provided today:  None today  · Recommendations/Intent for next treatment session: Next visit will focus on beginning gentle motion once pin is removed. .    Total Treatment Billable Duration:  30 minutes  OT Patient Time In/Time Out  Time In: 7588  Time Out: 0400  Aristeo Cooper OT    Future Appointments   Date Time Provider Catrachito Bruno   10/21/2019  9:30 AM STEPHANIA Ramirez

## 2019-10-17 ENCOUNTER — HOSPITAL ENCOUNTER (OUTPATIENT)
Dept: PHYSICAL THERAPY | Age: 44
Discharge: HOME | End: 2019-10-17
Payer: MEDICARE

## 2019-10-17 PROCEDURE — 97760 ORTHOTIC MGMT&TRAING 1ST ENC: CPT

## 2019-10-19 NOTE — PROGRESS NOTES
Destiny Pineda  : 1975  Primary: [address] Medic*  [place] [facility] at [address] [facility]  [address] [facility], [facility] [address], [facility] [address]  [phone]   [phone]       OUTPATIENT OCCUPATIONAL THERAPY: Daily Treatment Note 10/17/2019  Visit Count:  2    ICD-10: Treatment Diagnosis: Stiffness of right hand, not elsewhere classified (M25.641)Pain in right hand (M79.641)  Precautions/Allergies:   Latex; Adhesive; and Zofran [ondansetron hcl]   TREATMENT PLAN:  Effective Dates: 10/8/2019 TO 2020 (90 days). Frequency/Duration: 1-2 times a week for 90 Day(s)  ( ONCE PIN IS REMOVED   Pre-treatment Symptoms/Complaints:  I will be glad to get my splint shorten.   Pain: Initial: Pain Intensity 1: 3  Pain Location 1: Hand, Wrist  Pain Orientation 1: Right  Post Session:  3/10   Medications Last Reviewed:  10/17/2019  Updated Objective Findings:  None Today   TREATMENT:       Manual Therapy: ( ):     Therapeutic Exercise:                                                                               :                                                 Date:  10/8/19 Date:  10/17/19 Date: Date: Date:   Activity/Exercise Parameters Parameters Parameters Parameters Parameters   AROM during Fluidotherapy        Paraffin with Stretch          Retrograde massage, Friction Scar massage, Joint Mobilization          Scarf Curl          Washer Game        Individual Gripper          Hand [place]          Cones          Pegs          Clothes Pins          A-R Bar          Exerstick          Velcro-Roll                  RESISTIVE EXERCISES Weight/ Sets/Reps   Weight/ Sets/Reps Weight/ Sets/Reps Weight/ Sets/Reps Weight/ Sets/Reps   WEIGHT WELL        Sup/Pro        UD/RD        Wrist Flex/Ext        Free Weights          UBE(Minutes)          Nautilus        Compound Row        Vertical Chest        Overhead Press                    HEP: As above; handouts given to patient for all exercises. Therapeutic Modalities:                                               Joint Mobilization:        Treatment Times:  · Therapeutic Exercise:  minutes  · Manual Therapy:  minutes  · Parafin:  minutes  · Whirlpool:  minutes  · Other: Orthotic man/traing 15  Minutes ( patient's splint was adjusted to let her wrist move. )    Treatment/Session Summary:    · Response to Treatment:  Patients tolerated treatment well with no complications. Upon completion of treatment, skin condition was normal.  · Communication/Consultation:  None today  · Equipment provided today:  None today  · Recommendations/Intent for next treatment session: Next visit will focus on beginning gentle motion once pin is removed. .    Total Treatment Billable Duration:  15 minutes  OT Patient Time In/Time Out  Time In: 0300  Time Out: 0315  Nino Rooney OT    Future Appointments   Date Time Provider Catrachito Kiana   10/21/2019  9:30 AM Den Goyal OT [facility]PT SFE

## 2019-10-30 ENCOUNTER — HOSPITAL ENCOUNTER (OUTPATIENT)
Dept: PHYSICAL THERAPY | Age: 44
Discharge: HOME | End: 2019-10-30
Payer: MEDICARE

## 2019-10-30 PROCEDURE — 97110 THERAPEUTIC EXERCISES: CPT

## 2019-10-30 PROCEDURE — 97760 ORTHOTIC MGMT&TRAING 1ST ENC: CPT

## 2019-10-30 PROCEDURE — 97140 MANUAL THERAPY 1/> REGIONS: CPT

## 2019-10-30 NOTE — PROGRESS NOTES
Garcia Barch  : 1975  Primary: [address] Medic*  [place] [facility] at [address] [facility]  [address] [facility], [facility] [address], [place] [address]  [phone]   [phone]       OUTPATIENT OCCUPATIONAL THERAPY: Daily Treatment Note 10/30/2019  Visit Count:  3    ICD-10: Treatment Diagnosis: Stiffness of right hand, not elsewhere classified (M25.641)Pain in right hand (M79.641)  Precautions/Allergies:   Latex; Adhesive; and Zofran [ondansetron hcl]   TREATMENT PLAN:  Effective Dates: 10/8/2019 TO 2020 (90 days). Frequency/Duration: 1-2 times a week for 90 Day(s)  ( ONCE PIN IS REMOVED   Pre-treatment Symptoms/Complaints:  Patient states, \"He took the pin out early. I am not hurting now. \"  Pain: Initial: Pain Intensity 1: 0  Post Session:  0/10   Medications Last Reviewed:  10/17/2019  Updated Objective Findings:  None Today   TREATMENT:       Manual Therapy: (  15 minutes):    Completed scar friction massage and retrograde massage to decrease edema and scar tissue formation in right hand and wrist.    Therapeutic Exercise: 15 minutes                                                                              :                                                 Date:  10/8/19 Date:  10/17/19 Date:  10/30/19 Date: Date:   Activity/Exercise Parameters Parameters Parameters Parameters Parameters   AROM during Fluidotherapy        Paraffin with Stretch          Retrograde massage, Friction Scar massage, Joint Mobilization          Scarf Curl          Washer Game        Individual Gripper          Hand [place]          Cones          Pegs          Clothes Pins          A-R Bar          Exerstick          Velcro-Roll          Active range of motion   Issued HEP:  Palmar/radial abduction, thumb circumduction, wrist flexion/extension and radial/ulnar deviation x 10 reps each     RESISTIVE EXERCISES Weight/ Sets/Reps   Weight/ Sets/Reps Weight/ Sets/Reps Weight/ Sets/Reps Weight/ Sets/Reps   WEIGHT WELL        Sup/Pro        UD/RD        Wrist Flex/Ext        Free Weights          UBE(Minutes)          Nautilus        Compound Row        Vertical Chest        Overhead Press                    HEP: As above; handouts given to patient for all exercises. Therapeutic Modalities:                                               Joint Mobilization:        Orthotic Management/Training: (15 minutes): This hand-based thumb - right orthotic is being utilized in order to increase patient's functional independence. Patient/caregiver educated to proper application and appropriate use of this orthotic and the patient benefits from this orthotic by achieving increased joint stability. Added odor and moisture absorbent padding to inside of thumb splint and reinforced seam on thumb with heat gun. Treatment Times:  · Therapeutic Exercise: 15 minutes  · Manual Therapy: 15  minutes  · Parafin:  minutes  · Whirlpool:  minutes  · Other: Orthotic man/traing 15  Minutes ( patient's splint was adjusted to let her wrist move. )    Treatment/Session Summary:    · Response to Treatment:  Patients tolerated treatment well with no complications. Upon completion of treatment, skin condition was normal.  · Communication/Consultation:  None today  · Equipment provided today:  None today  · Recommendations/Intent for next treatment session: Next visit will focus on continuing gentle range of motion; initiate heat modalities as tolerated.     Total Treatment Billable Duration:  45 minutes  OT Patient Time In/Time Out  Time In: 8482  Time Out: [address] [place] [address],     Future Appointments   Date Time Provider Catrachito Bruno   11/6/2019 11:00 AM STEPHANIA Schwartz SFE   11/11/2019  3:15 PM STEPHANIA SchwartzORPLEILA SFE   11/13/2019  1:00 PM Merry eRyez OT [facility] SFE   11/18/2019  2:30 PM Merry Reyez OT [facility] SFE   11/20/2019 1:00 PM Chris Rubio OT [facility] KIRA   11/25/2019  1:00 PM Chris Rubio OT [facility] LISSETTEE

## 2019-11-06 ENCOUNTER — HOSPITAL ENCOUNTER (OUTPATIENT)
Dept: PHYSICAL THERAPY | Age: 44
Discharge: HOME | End: 2019-11-06
Payer: MEDICARE

## 2019-11-06 PROCEDURE — 97140 MANUAL THERAPY 1/> REGIONS: CPT

## 2019-11-06 PROCEDURE — 97110 THERAPEUTIC EXERCISES: CPT

## 2019-11-06 NOTE — PROGRESS NOTES
Maureen Moya  : 1975  Primary: [address] Medic*  [place] Of [facility] at [place] [address] [facility], [address], [place] [address].  [phone]   [phone]       OUTPATIENT OCCUPATIONAL THERAPY: Daily Treatment Note 2019  Visit Count:  4    ICD-10: Treatment Diagnosis: Stiffness of right hand, not elsewhere classified (M25.641)Pain in right hand (M79.641)  Precautions/Allergies:   Latex; Adhesive; and Zofran [ondansetron hcl]   TREATMENT PLAN:  Effective Dates: 10/8/2019 TO 2020 (90 days). Frequency/Duration: 1-2 times a week for 90 Day(s)  ( ONCE PIN IS REMOVED   Pre-treatment Symptoms/Complaints:  Patient states, \"I am sorry that I am so late; I tried to call but I didn't have the right number. \"  Pain: Initial: Pain Intensity 1: 5  Pain Location 1: Hand, Wrist  Pain Orientation 1: Right  Post Session:  0/10   Medications Last Reviewed:  10/17/2019  Updated Objective Findings:  None Today   TREATMENT:       Manual Therapy: (  15 minutes):    Completed scar friction massage and retrograde massage to decrease edema and scar tissue formation in right hand and wrist.    Therapeutic Exercise: 20 minutes                                                                              :                                                 Date:  10/8/19 Date:  10/17/19 Date:  10/30/19 Date:  19 Date:   Activity/Exercise Parameters Parameters Parameters Parameters Parameters   AROM during Fluidotherapy    10 minutes with  AROM right wrist and hand    Paraffin with Stretch          Retrograde massage, Friction Scar massage, Joint Mobilization          Scarf Curl      X 3 reps with fingers then thumb    Washer Game    X 3 reps    Individual Gripper          Hand [place]          Cones          Pegs          Clothes Pins          A-R Bar          Exerstick          Velcro-Roll          Active range of motion   Issued HEP:  Palmar/radial abduction, thumb circumduction, wrist flexion/extension and radial/ulnar deviation x 10 reps each Reviewed HEP  AROM wrist, fingers, thumb all planes of motion x 5 reps each    RESISTIVE EXERCISES Weight/ Sets/Reps   Weight/ Sets/Reps Weight/ Sets/Reps Weight/ Sets/Reps Weight/ Sets/Reps   WEIGHT WELL        Sup/Pro        UD/RD        Wrist Flex/Ext        Free Weights          UBE(Minutes)          Nautilus        Compound Row        Vertical Chest        Overhead Press                    HEP: As above; handouts given to patient for all exercises. Therapeutic Modalities:                                               Joint Mobilization:        Orthotic Management/Training: (15 minutes): This hand-based thumb - right orthotic is being utilized in order to increase patient's functional independence. Patient/caregiver educated to proper application and appropriate use of this orthotic and the patient benefits from this orthotic by achieving increased joint stability. Added odor and moisture absorbent padding to inside of thumb splint and reinforced seam on thumb with heat gun. Treatment Times:  · Therapeutic Exercise: 20 minutes  · Manual Therapy: 15  minutes  · Parafin:  minutes  · Whirlpool:  minutes  · Other: Orthotic man/traing   Minutes    Treatment/Session Summary:    · Response to Treatment:  Patients tolerated treatment well with no complications. Upon completion of treatment, skin condition was normal. Patient with increased complaint of pain at beginning of session; improved pain complaint and active range of motion with use of heat. · Communication/Consultation:  None today  · Equipment provided today:  None today  · Recommendations/Intent for next treatment session: Next visit will focus on continuing gentle range of motion; continue heat modalities as tolerated.     Total Treatment Billable Duration:  35 minutes  OT Patient Time In/Time Out  Time In: 1125  Time Out: [address] Clearance STEPHANIA Edwards    Future Appointments   Date Time Provider Catrachito Kiana   11/11/2019  3:15 PM Wilbur Campos OT [facility] SFE   11/13/2019  1:00 PM STEPHANIA HansonEORPT SFE   11/18/2019  2:30 PM Wilbur Campos OT [facility] SFE   11/20/2019  1:00 PM Wilbur Campos OT [facility] SFE   11/25/2019  1:00 PM Wilbur Campos OT [facility] SFE

## 2019-11-11 ENCOUNTER — HOSPITAL ENCOUNTER (OUTPATIENT)
Dept: PHYSICAL THERAPY | Age: 44
Discharge: HOME | End: 2019-11-11
Payer: MEDICARE

## 2019-11-11 PROCEDURE — 97110 THERAPEUTIC EXERCISES: CPT

## 2019-11-11 PROCEDURE — 97018 PARAFFIN BATH THERAPY: CPT

## 2019-11-11 PROCEDURE — 97140 MANUAL THERAPY 1/> REGIONS: CPT

## 2019-11-11 NOTE — PROGRESS NOTES
Alphonso Land  : 1975  Primary: [address] Medic*  Secondary: [place] [facility] at [address] [facility]  [address], [address], [address] [facility]  [phone]   [phone]       OUTPATIENT OCCUPATIONAL THERAPY: Daily Treatment Note 2019  Visit Count:  5    ICD-10: Treatment Diagnosis: Stiffness of right hand, not elsewhere classified (M25.641)Pain in right hand (M79.641)  Precautions/Allergies:   Latex; Adhesive; and Zofran [ondansetron hcl]   TREATMENT PLAN:  Effective Dates: 10/8/2019 TO 2020 (90 days). Frequency/Duration: 1-2 times a week for 90 Day(s)  ( ONCE PIN IS REMOVED   Pre-treatment Symptoms/Complaints:  Patient states, \"I am not hurting today. \"  Pain: Initial: Pain Intensity 1: 0  Post Session:  0/10   Medications Last Reviewed:  10/17/2019  Updated Objective Findings:  None Today   TREATMENT:       Manual Therapy: (  15 minutes):    Completed scar friction massage and retrograde massage to decrease edema and scar tissue formation in right hand and wrist.    Therapeutic Exercise: 30 minutes                                                                              :                                                 Date:  10/8/19 Date:  10/17/19 Date:  10/30/19 Date:  19 Date:  19   Activity/Exercise Parameters Parameters Parameters Parameters Parameters   AROM during Fluidotherapy    10 minutes with  AROM right wrist and hand 20 minutes with  AROM right wrist and hand   Paraffin with Stretch          Retrograde massage, Friction Scar massage, Joint Mobilization          Scarf Curl      X 3 reps with fingers then thumb    Washer Game    X 3 reps    Individual Gripper          Hand [place]          Cones          Pegs          Clothes Pins          A-R Bar          Exerstick          Velcro-Roll          Active range of motion   Issued HEP:  Palmar/radial abduction, thumb circumduction, wrist flexion/extension and radial/ulnar deviation x 10 reps each Reviewed HEP  AROM wrist, fingers, thumb all planes of motion x 5 reps each Palmar/radial abduction, thumb circumduction, opposition, wrist flexion/extension and radial/ulnar deviation 2 x 10 reps each   RESISTIVE EXERCISES Weight/ Sets/Reps   Weight/ Sets/Reps Weight/ Sets/Reps Weight/ Sets/Reps Weight/ Sets/Reps   WEIGHT WELL        Sup/Pro        UD/RD        Wrist Flex/Ext        Free Weights          UBE(Minutes)          Nautilus        Compound Row        Vertical Chest        Overhead Press                    HEP: As above; handouts given to patient for all exercises. Therapeutic Modalities: Paraffin to right wrist and hand x 15 minutes prior to exercise in order to decrease stiffness and improve functional movement. Joint Mobilization:          Treatment Times:  · Therapeutic Exercise: 30 minutes  · Manual Therapy: 15  minutes  · Parafin: 15 minutes  · Whirlpool:  minutes  · Other: Orthotic man/traing   Minutes    Treatment/Session Summary:    · Response to Treatment:  Patients tolerated treatment well with no complications. Upon completion of treatment, skin condition was normal. Patient with increasing active range of motion of the right thumb, hand and wrist.  · Communication/Consultation:  None today  · Equipment provided today:  None today  · Recommendations/Intent for next treatment session: Next visit will focus on continuing gentle range of motion; continue heat modalities as tolerated.     Total Treatment Billable Duration:  60 minutes  OT Patient Time In/Time Out  Time In: 4837  Time Out: [address] [facility], OT    Future Appointments   Date Time Provider Catrachito Bruno   11/13/2019  1:00 PM [facility]   11/18/2019  2:30 PM STEPHANIA Farley MARY   11/20/2019  1:00 PM STEPHANIA Farley MARY   11/25/2019  1:00 PM Jocelyn Alford Sandra Jacob, OT [facility]

## 2019-11-18 ENCOUNTER — HOSPITAL ENCOUNTER (OUTPATIENT)
Dept: PHYSICAL THERAPY | Age: 44
Discharge: HOME | End: 2019-11-18
Payer: MEDICARE

## 2019-11-18 PROCEDURE — 97140 MANUAL THERAPY 1/> REGIONS: CPT

## 2019-11-18 PROCEDURE — 97018 PARAFFIN BATH THERAPY: CPT

## 2019-11-18 PROCEDURE — 97110 THERAPEUTIC EXERCISES: CPT

## 2019-11-18 NOTE — PROGRESS NOTES
Rhonda Gay  : 1975  Primary: [address]*  [place]: [place] Of [facility] at [address], [address], [place] [address].  [phone]   [phone]       OUTPATIENT OCCUPATIONAL THERAPY: Daily Treatment Note 2019  Visit Count:  6    ICD-10: Treatment Diagnosis: Stiffness of right hand, not elsewhere classified (M25.641)Pain in right hand (M79.641)  Precautions/Allergies:   Latex; Adhesive; and Zofran [ondansetron hcl]   TREATMENT PLAN:  Effective Dates: 10/8/2019 TO 2020 (90 days). Frequency/Duration: 1-2 times a week for 90 Day(s)  ( ONCE PIN IS REMOVED   Pre-treatment Symptoms/Complaints:  Patient states, \"I am feeling good today. \"  Pain: Initial: Pain Intensity 1: 0  Post Session:  0/10   Medications Last Reviewed:  10/17/2019  Updated Objective Findings:  None Today   TREATMENT:       Manual Therapy: (  15 minutes):    Completed scar friction massage and retrograde massage to decrease edema and scar tissue formation in right hand and wrist.    Therapeutic Exercise: 30 minutes                                                                              :                                                 Date:  10/8/19 Date:  10/17/19 Date:  10/30/19 Date:  19 Date:  19 Date:  19   Activity/Exercise Parameters Parameters Parameters Parameters Parameters    AROM during Fluidotherapy    10 minutes with  AROM right wrist and hand 20 minutes with  AROM right wrist and hand 15 minutes with  AROM right wrist and hand   Paraffin with Stretch           Retrograde massage, Friction Scar massage, Joint Mobilization           Scarf Curl      X 3 reps with fingers then thumb  X 3 reps with fingers then thumb   Washer Game    X 3 reps  X 3 reps   Individual Gripper           Hand [place]           Cones           Pegs           Clothes Pins           A-R Bar           Exerstick Velcro-Roll           Active range of motion   Issued HEP:  Palmar/radial abduction, thumb circumduction, wrist flexion/extension and radial/ulnar deviation x 10 reps each Reviewed HEP  AROM wrist, fingers, thumb all planes of motion x 5 reps each Palmar/radial abduction, thumb circumduction, opposition, wrist flexion/extension and radial/ulnar deviation 2 x 10 reps each Palmar/radial abduction, thumb circumduction, opposition, wrist flexion/extension and radial/ulnar deviation 2 x 10 reps each   RESISTIVE EXERCISES Weight/ Sets/Reps   Weight/ Sets/Reps Weight/ Sets/Reps Weight/ Sets/Reps Weight/ Sets/Reps    WEIGHT WELL         Sup/Pro         UD/RD         Wrist Flex/Ext         Free Weights           UBE(Minutes)           Nautilus         Compound Row         Vertical Chest         Overhead Press                      HEP: As above; handouts given to patient for all exercises. Therapeutic Modalities: Paraffin to right wrist and hand x 10 minutes prior to exercise in order to decrease stiffness and improve functional movement. Joint Mobilization:          Treatment Times:  · Therapeutic Exercise: 30 minutes  · Manual Therapy: 15  minutes  · Parafin: 10 minutes  · Whirlpool:  minutes  · Other: Orthotic man/traing   Minutes    Treatment/Session Summary:    · Response to Treatment:  Patients tolerated treatment well with no complications. Upon completion of treatment, skin condition was normal. Patient with increasing active range of motion of the right thumb, hand and wrist without complaint of pain. · Communication/Consultation:  None today  · Equipment provided today:  None today  · Recommendations/Intent for next treatment session: Next visit will focus on continuing gentle range of motion; continue heat modalities as tolerated.     Total Treatment Billable Duration:  55 minutes  OT Patient Time In/Time Out  Time In: 3325  Time Out: [address] [facility] Teterborogaro Tejeda OT    Future Appointments   Date Time Provider Catrachito Kiana   11/20/2019  1:00 PM Boo Ignacio OT [facility]   11/25/2019  1:00 PM Boo Ignacio OT [facility]IR[facility]

## 2019-11-20 ENCOUNTER — HOSPITAL ENCOUNTER (OUTPATIENT)
Dept: PHYSICAL THERAPY | Age: 44
Discharge: HOME | End: 2019-11-20
Payer: MEDICARE

## 2019-11-20 PROCEDURE — 97018 PARAFFIN BATH THERAPY: CPT

## 2019-11-20 PROCEDURE — 97110 THERAPEUTIC EXERCISES: CPT

## 2019-11-20 PROCEDURE — 97140 MANUAL THERAPY 1/> REGIONS: CPT

## 2019-11-20 NOTE — PROGRESS NOTES
Frandy Friend  : 1975  Primary: [address]*  [place]: [place] [facility] at [address], [address], [place].  [phone]   [phone]       OUTPATIENT OCCUPATIONAL THERAPY: Daily Treatment Note 2019  Visit Count:  7    ICD-10: Treatment Diagnosis: Stiffness of right hand, not elsewhere classified (M25.641)Pain in right hand (M79.641)  Precautions/Allergies:   Latex; Adhesive; and Zofran [ondansetron hcl]   TREATMENT PLAN:  Effective Dates: 10/8/2019 TO 2020 (90 days). Frequency/Duration: 1-2 times a week for 90 Day(s)  ( ONCE PIN IS REMOVED   Pre-treatment Symptoms/Complaints:  Patient states, \"I saw Dr. Daisy Nolan; he wants us to wait until next week when I am exactly 8 weeks out to start pushing on my thumb. \"  Pain: Initial: Pain Intensity 1: 0  Post Session:  0/10   Medications Last Reviewed:  10/17/2019  Updated Objective Findings:  None Today   TREATMENT:       Manual Therapy: (  15 minutes):    Completed scar friction massage and retrograde massage to decrease edema and scar tissue formation in right hand and wrist.    Therapeutic Exercise: 30 minutes                                                                              :                                                 Date:  10/8/19 Date:  10/17/19 Date:  10/30/19 Date:  19 Date:  19 Date:  19 Date:  19   Activity/Exercise Parameters Parameters Parameters Parameters Parameters     AROM during Fluidotherapy    10 minutes with  AROM right wrist and hand 20 minutes with  AROM right wrist and hand 15 minutes with  AROM right wrist and hand 15 minutes with  AROM right wrist and hand   Paraffin with Stretch            Retrograde massage, Friction Scar massage, Joint Mobilization            Scarf Curl      X 3 reps with fingers then thumb  X 3 reps with fingers then thumb X 3 reps with fingers then thumb Washer Game    X 3 reps  X 3 reps X 3 reps   Individual Gripper            Hand [place]            Cones            Pegs            Clothes Pins            A-R Bar            Exerstick            Velcro-Roll            Active range of motion   Issued HEP:  Palmar/radial abduction, thumb circumduction, wrist flexion/extension and radial/ulnar deviation x 10 reps each Reviewed HEP  AROM wrist, fingers, thumb all planes of motion x 5 reps each Palmar/radial abduction, thumb circumduction, opposition, wrist flexion/extension and radial/ulnar deviation 2 x 10 reps each Palmar/radial abduction, thumb circumduction, opposition, wrist flexion/extension and radial/ulnar deviation 2 x 10 reps each Palmar/radial abduction, thumb circumduction, opposition, wrist flexion/extension and radial/ulnar deviation 2 x 10 reps each   RESISTIVE EXERCISES Weight/ Sets/Reps   Weight/ Sets/Reps Weight/ Sets/Reps Weight/ Sets/Reps Weight/ Sets/Reps     WEIGHT WELL          Sup/Pro          UD/RD          Wrist Flex/Ext          Free Weights            UBE(Minutes)            Nautilus          Compound Row          Vertical Chest          Overhead Press                        HEP: As above; handouts given to patient for all exercises. Therapeutic Modalities: Paraffin to right wrist and hand x 10 minutes prior to exercise in order to decrease stiffness and improve functional movement. Joint Mobilization:          Treatment Times:  · Therapeutic Exercise: 30 minutes  · Manual Therapy: 15  minutes  · Parafin: 10 minutes  · Whirlpool:  minutes  · Other: Orthotic man/traing   Minutes    Treatment/Session Summary:    · Response to Treatment:  Patients tolerated treatment well with no complications. Upon completion of treatment, skin condition was normal. Patient with increasing active range of motion of the right thumb, hand and wrist without complaint of pain.  Will begin passive range next week.  · Communication/Consultation:  None today  · Equipment provided today:  None today  · Recommendations/Intent for next treatment session: Next visit will focus on continuing gentle range of motion; continue heat modalities as tolerated.     Total Treatment Billable Duration:  55 minutes  OT Patient Time In/Time Out  Time In: 0441  Time Out: [facility],     Future Appointments   Date Time Provider Catrachito Bruno   11/25/2019  1:00 PM Mark [place], OT [facility]   12/2/2019  3:15 PM Mark [place], OT [facility]   12/4/2019  3:15 PM Mark [place], OT [facility]   12/10/2019  3:15 PM [place], OT [facility]   12/12/2019  3:15 PM Mark [place], OT [facility]   12/18/2019  3:15 PM Mark [place], OT [facility]

## 2019-12-02 ENCOUNTER — HOSPITAL ENCOUNTER (OUTPATIENT)
Dept: PHYSICAL THERAPY | Age: 44
Discharge: HOME | End: 2019-12-02
Payer: MEDICARE

## 2019-12-02 NOTE — PROGRESS NOTES
Jeremyndmihir Willett  : 1975  Primary: [address] Medic*  Secondary:  [address] [facility]  [address] [facility], [address] [place] [address],[address], [facility] [address].  [phone]   [phone]       Patient cancelled secondary to eye doctor appointment.     Richa Cordero, OT

## 2019-12-04 ENCOUNTER — HOSPITAL ENCOUNTER (OUTPATIENT)
Dept: PHYSICAL THERAPY | Age: 44
Discharge: HOME | End: 2019-12-04
Payer: MEDICARE

## 2019-12-04 PROCEDURE — 97018 PARAFFIN BATH THERAPY: CPT

## 2019-12-04 PROCEDURE — 97140 MANUAL THERAPY 1/> REGIONS: CPT

## 2019-12-04 PROCEDURE — 97110 THERAPEUTIC EXERCISES: CPT

## 2019-12-04 NOTE — PROGRESS NOTES
Boston Michelle  : 1975  Primary: [address]*  [place]: [place] [facility] at [place] [id number] [facility], [address] [place], [place] [address], [address] [facility]  [phone]   [phone]       OUTPATIENT OCCUPATIONAL THERAPY: Daily Treatment Note 2019  Visit Count:  8    ICD-10: Treatment Diagnosis: Stiffness of right hand, not elsewhere classified (M25.641)Pain in right hand (M79.641)  Precautions/Allergies:   Latex; Adhesive; and Zofran [ondansetron hcl]   TREATMENT PLAN:  Effective Dates: 10/8/2019 TO 2020 (90 days). Frequency/Duration: 1-2 times a week for 90 Day(s)  ( ONCE PIN IS REMOVED   Pre-treatment Symptoms/Complaints:  Patient states, \"I did some raking today and my thumb was hurting. I took a pain pill so it is not hurting now. \"  Pain: Initial: Pain Intensity 1: 0  Post Session:  0/10   Medications Last Reviewed:  10/17/2019  Updated Objective Findings:  None Today   TREATMENT:       Manual Therapy: (  15 minutes):    Completed scar friction massage and retrograde massage to decrease edema and scar tissue formation in right hand and wrist.    Therapeutic Exercise: 30 minutes                                                                              :                                                 Date:  10/8/19 Date:  10/17/19 Date:  10/30/19 Date:  19 Date:  19 Date:  19 Date:  19 Date:  19   Activity/Exercise Parameters Parameters Parameters Parameters Parameters      AROM during Fluidotherapy    10 minutes with  AROM right wrist and hand 20 minutes with  AROM right wrist and hand 15 minutes with  AROM right wrist and hand 15 minutes with  AROM right wrist and hand 20 minutes with  AROM right wrist and hand   Paraffin with Stretch             Retrograde massage, Friction Scar massage, Joint Mobilization             Scarf Curl      X 3 reps with fingers then thumb  X 3 reps with fingers then thumb X 3 reps with fingers then thumb X 3 reps with fingers then thumb   Washer Game    X 3 reps  X 3 reps X 3 reps X 3 reps   Individual Gripper             Hand [place]             Cones             Pegs             Clothes Pins             A-R Bar             Exerstick             Velcro-Roll             Passive range of motion        Thumb flexion/extension and abduction/adduction x 10 reps each   Active range of motion   Issued HEP:  Palmar/radial abduction, thumb circumduction, wrist flexion/extension and radial/ulnar deviation x 10 reps each Reviewed HEP  AROM wrist, fingers, thumb all planes of motion x 5 reps each Palmar/radial abduction, thumb circumduction, opposition, wrist flexion/extension and radial/ulnar deviation 2 x 10 reps each Palmar/radial abduction, thumb circumduction, opposition, wrist flexion/extension and radial/ulnar deviation 2 x 10 reps each Palmar/radial abduction, thumb circumduction, opposition, wrist flexion/extension and radial/ulnar deviation 2 x 10 reps each Palmar/radial abduction, thumb circumduction, opposition, wrist flexion/extension and radial/ulnar deviation 2 x 10 reps each   RESISTIVE EXERCISES Weight/ Sets/Reps   Weight/ Sets/Reps Weight/ Sets/Reps Weight/ Sets/Reps Weight/ Sets/Reps      WEIGHT WELL           Sup/Pro           UD/RD           Wrist Flex/Ext           Free Weights             UBE(Minutes)             Nautilus           Compound Row           Vertical Chest           Overhead Press                          HEP: As above; handouts given to patient for all exercises. Therapeutic Modalities: Paraffin to right wrist and hand x 10 minutes prior to exercise in order to decrease stiffness and improve functional movement.                                               Joint Mobilization:          Treatment Times:  · Therapeutic Exercise: 30 minutes  · Manual Therapy: 15  minutes  · Parafin: 10 minutes  · Whirlpool:  minutes  · Other: Orthotic man/traing Minutes    Treatment/Session Summary:    · Response to Treatment:  Patients tolerated treatment well with no complications. Upon completion of treatment, skin condition was normal. Patient with increasing active range of motion of the right thumb, hand and wrist without complaint of pain. Initiated passive range of right thumb today to patient's tolerance. · Communication/Consultation:  None today  · Equipment provided today:  None today  · Recommendations/Intent for next treatment session: Next visit will focus on continuing gentle range of motion; continue heat modalities as tolerated.     Total Treatment Billable Duration:  55 minutes  OT Patient Time In/Time Out  Time In: 4163  Time Out: 0810 Jacoby Spicer, STEPHANIA    Future Appointments   Date Time Provider Catrachito Bruno   12/10/2019  3:15 PM Maude Bui OT [facility] KIRA   12/12/2019  3:15 PM Maude Bui OT [facility] KIR[facility]   12/18/2019  3:15 PM [facility]

## 2019-12-10 ENCOUNTER — HOSPITAL ENCOUNTER (OUTPATIENT)
Dept: PHYSICAL THERAPY | Age: 44
Discharge: HOME | End: 2019-12-10
Payer: MEDICARE

## 2019-12-10 NOTE — PROGRESS NOTES
Dewey Frausto  : 1975  Primary: [address]*  [place]: [place] [facility] at [place] [id number] [facility], [address] [place] [address],[address], [facility] [address].  [phone]   [phone]       Patient cancelled today's appointment secondary to illness.      Karthik Garcia, OT

## 2019-12-12 ENCOUNTER — HOSPITAL ENCOUNTER (OUTPATIENT)
Dept: PHYSICAL THERAPY | Age: 44
Discharge: HOME | End: 2019-12-12
Payer: MEDICARE

## 2019-12-12 NOTE — PROGRESS NOTES
Anastasiia Gann  : 1975  Primary: [address]*  [place] [facility] at [place] [id number] [facility], [address] [place] [address],[address], [place] [address].  [phone]   [phone]       Patient cancelled today's appointment secondary to illness.      Gabe Otero, OT

## 2020-02-06 NOTE — THERAPY DISCHARGE
Garcia Higuera  : 1975  Primary: [address] Medic*  Secondary: [place] [facility] [place]  [address] [facility], [address] [place] [address],[address], [facility] [address].  [phone]   [phone]          OUTPATIENT OCCUPATIONAL THERAPY:Discontinuation Summary    ICD-10: Treatment Diagnosis: Stiffness of right hand, not elsewhere classified (M25.641)Pain in right hand (M79.641)  Precautions/Allergies:   Latex; Adhesive; and Zofran [ondansetron hcl]   TREATMENT PLAN:  Effective Dates: 10/8/2019 TO 2020 (90 days). Frequency/Duration: 1-2 times a week for 90 Day(s)  ( ONCE PIN IS REMOVED ) MEDICAL/REFERRING DIAGNOSIS:  Unilateral primary osteoarthritis of first carpometacarpal joint, right hand [M18.11]   DATE OF ONSET: 1 year ago  REFERRING PHYSICIAN: Joycelyn Mahmood MD MD Orders: Splint to go around pin  Return MD Appointment: 2 weeks     INITIAL ASSESSMENT:   Ms. Nida Montanez presents with decreased functional use, strength and range of motion of her right hand and upper extremity that is affecting her independence with activities of daily living and ability to perform job tasks. I feel that Ms. Wise will benefit from skilled occupational therapy to maximize the functional use of her hand and upper extremity in daily activities . PROBLEM LIST (Impacting functional limitations):  1. Pain in right hand. 2. Decreased motion in right hand. 3. Decreased strength in right hand. INTERVENTIONS PLANNED: (Treatment may consist of any combination of the following)  1. Modalities that may include fluidotherapy, paraffin, ultrasound, and light therapy. 2. Therapeutic exercise including a home exercise program.  3. Manual therapy. 4. Therapeutic activities. GOALS: (Goals have been discussed and agreed upon with patient.)  Short-Term Functional Goals: Time Frame: 4 weeks  1. Decrease pain to 5 to allow patient to perform self care tasks.  ( THE PATIENT\"S GOALS WERE NOT REASSESSED ) The patient canceled or no showed for her last 6 appointments. 2. Increase motion in right hand by 20 degrees to improve functional use of upper extremity in ADL activities. 3. Increase strength in right hand by 10 pounds to allow patient to  and lift objects during self care activities. Discharge Goals: Time Frame: 12 weeks  1. Decrease pain to 3 to allow patient to perform all household tasks. .  2. Increase motion in hand by 30 degreees to allow patient to perform all ADL activities. 3. Increase strength in right hand by 20 pounds to allow patient to , lift, hold, and carry heavy objects. OUTCOME MEASURE:   Tool Used: Disabilities of the Arm, Shoulder and Hand (DASH) Questionnaire - Quick Version  Score:  Initial: 46/55  Most Recent: X/55 (Date: -- )   Interpretation of Score: The DASH is designed to measure the activities of daily living in person's with upper extremity dysfunction or pain. Each section is scored on a 1-5 scale, 5 representing the greatest disability. The scores of each section are added together for a total score of 55. MEDICAL NECESSITY:   · Patient is expected to demonstrate progress in strength and range of motion to increase independence with ADL and household activities. REASON FOR SERVICES/OTHER COMMENTS:  · The patient has limited motion, strength and function in her right hand. .  Total Duration:       Rehabilitation Potential For Stated Goals: Good  Regarding Roxane Wise's therapy, I certify that the treatment plan above will be carried out by a therapist or under their direction. Thank you for this referral,  Nba Guido OT     Referring Physician Signature: Teresa Boo MD _______________________________ Date _____________     PAIN/SUBJECTIVE:   Initial:    Post Session:  8/10   OCCUPATIONAL PROFILE & HISTORY:   History of Injury/Illness (Reason for Referral):   The patient has had increasing arthritic pain in her right thumb for over a year. Past Medical History/Comorbidities:   Ms. Alea Christianson  has a past medical history of Adverse effect of anesthesia, Arthritis, Chronic thumb pain, right, Diabetes ([address] GERD (gastroesophageal reflux disease), Hypertension, Morbid obesity ([address] Psychiatric disorder, Right wrist pain, and Sleep apnea. She also has no past medical history of COPD, Dementia, Endocrine disease, Gastrointestinal disorder, Infectious disease, Neurological disorder, Other ill-defined conditions(799.89), or Sleep disorder. Ms. Alea Christianson  has a past surgical history that includes hx gi (2013); hx gastric bypass (2013); hx lap cholecystectomy (2013); hx orthopaedic (2001); hx orthopaedic (1992); and hx orthopaedic (2018). Social History/Living Environment:      Prior Level of Function/Work/Activity:  Independent  Dominant Side:         RIGHT   Ambulatory/Rehab Services H2 Model Falls Risk Assessment   Risk Factors:       No Risk Factors Identified Ability to Rise from Chair:       (0)  Ability to rise in a single movement   Falls Prevention Plan:       No modifications necessary   Total: (5 or greater = High Risk): 0   ©2010 [facility] [employer]. All Rights Reserved. [facility] Patent #[id number]. Federal Law prohibits the replication, distribution or use without written permission from [facility] [employer]   Current Medications:       Current Outpatient Medications:     omeprazole (PRILOSEC) 20 mg capsule, Take 20 mg by mouth daily. , Disp: , Rfl:     FLUoxetine (PROZAC) 20 mg tablet, Take 20 mg by mouth nightly., Disp: , Rfl:     hydrOXYzine HCl (ATARAX) 25 mg tablet, Take 25 mg by mouth nightly., Disp: , Rfl:     traMADol (ULTRAM) 50 mg tablet, Take 1 Tab by mouth every six (6) hours as needed for Pain.  Max Daily Amount: 200 mg., Disp: 20 Tab, Rfl: 0    traZODone (DESYREL) 50 mg tablet, Take 100 mg by mouth nightly., Disp: , Rfl:     AZO CRANBERRY 755-85-03 mg-mg-million tab, Take  by mouth., Disp: , Rfl:     folic acid 284 mcg tablet, Take 800 mcg by mouth daily. , Disp: , Rfl:     loratadine-pseudoephedrine (CLARITIN-D 12 HOUR) 5-120 mg per tablet, Take 1 Tab by mouth two (2) times a day., Disp: 30 Tab, Rfl: 1    calcium 500 mg tab, Take  by mouth., Disp: , Rfl:     Cholecalciferol, Vitamin D3, (VITAMIN D3) 1,000 unit cap, Take  by mouth., Disp: , Rfl:     fluticasone (FLONASE) 50 mcg/actuation nasal spray, nightly., Disp: , Rfl:     acyclovir (ZOVIRAX) 200 mg capsule, Take 200 mg by mouth three (3) times daily as needed. , Disp: , Rfl:     Biotin 2,500 mcg cap, Take 10,000 mcg by mouth., Disp: , Rfl:     nystatin (MYCOSTATIN) powder, Apply  to affected area four (4) times daily. , Disp: , Rfl:     promethazine (PHENERGAN) 25 mg tablet, Take 25 mg by mouth every six (6) hours as needed for Nausea., Disp: , Rfl:     POTASSIUM GLUCONATE PO, Take  by mouth., Disp: , Rfl:    Date Last Reviewed:  10/8/2019   Complexity Level: Brief history (0):  LOW COMPLEXITY   ASSESSMENT OF OCCUPATIONAL PERFORMANCE:   RANGE OF MOTION:     · AROM: The patient has limited motion in her right hand and wrist. Measurements not taken due to recent surgery. STRENGTH:  Not tested yet. SENSATION:  The patient reports numbness and tingling in her right hand. Physical Skills Involved:  1. Range of Motion  2. Strength  3. Sensation  4. Pain (acute)  5. Edema Cognitive Skills Affected (resulting in the inability to perform in a timely and safe manner):   1. none Psychosocial Skills Affected:  1. none   Number of elements that affect the Plan of Care[de-identified] 5+:  HIGH COMPLEXITY   CLINICAL DECISION MAKING:   Clinical Decision-Making Assessment:     Assessment process, impact of co-morbidities, assessment modification\need for assistance, and selection of interventions: Analytical Complexity:MODERATE COMPLEXITY

## 2020-06-16 NOTE — ED TRIAGE NOTES
Pt has ear ache on the right side with headache on the right side. She is also complaining of dysuria and back pain. All symptoms started Thursday. Horacio Thomas

## 2022-09-13 NOTE — ED TRIAGE NOTES
Pt reports chest pain that started a few weeks ago but has progressively gotten worse. Pt reports feeling  \"hot flashes\" and being dizzy. Pt reports shortness of breath.       Bony Carrasco RN negative normal/regular rate and rhythm/S1 S2 present/no gallops/no rub/no murmur

## 2022-12-14 ENCOUNTER — TELEPHONE (OUTPATIENT)
Dept: ORTHOPEDIC SURGERY | Age: 47
End: 2022-12-14

## 2022-12-14 ENCOUNTER — OFFICE VISIT (OUTPATIENT)
Dept: ORTHOPEDIC SURGERY | Age: 47
End: 2022-12-14

## 2022-12-14 DIAGNOSIS — M18.12 ARTHRITIS OF CARPOMETACARPAL (CMC) JOINT OF LEFT THUMB: Primary | ICD-10-CM

## 2022-12-14 DIAGNOSIS — G56.02 LEFT CARPAL TUNNEL SYNDROME: ICD-10-CM

## 2022-12-14 RX ORDER — BETAMETHASONE SODIUM PHOSPHATE AND BETAMETHASONE ACETATE 3; 3 MG/ML; MG/ML
6 INJECTION, SUSPENSION INTRA-ARTICULAR; INTRALESIONAL; INTRAMUSCULAR; SOFT TISSUE ONCE
Status: SHIPPED | OUTPATIENT
Start: 2022-12-14

## 2022-12-14 RX ORDER — TRAMADOL HYDROCHLORIDE 50 MG/1
50 TABLET ORAL EVERY 6 HOURS PRN
Qty: 20 TABLET | Refills: 0 | Status: SHIPPED | OUTPATIENT
Start: 2022-12-14 | End: 2022-12-19

## 2022-12-14 NOTE — PROGRESS NOTES
Patient was fit for a [facility] splint for patients left hand/joint. I demonstrated that the thumb slides into the opening and Velcro on the dorsal side of the hand. The strap continues around the thumb and Velcro's again on the ventral side of hand or palm, and then continues through the thumb and first finger to Velcro again on the dorsal side of hand. Patient read and signed documenting they understand and agree to [facility]'s current DME return policy.

## 2022-12-14 NOTE — TELEPHONE ENCOUNTER
Patient is calling in regards to Tramadol that was supposed to be sent into [facility].  Please call her

## 2022-12-14 NOTE — LETTER
DME Patient Authorization Form    Name: Eboni Basurto  : 1975  MRN: [id number]   Age: 52 y.o. Gender: female  Delivery Address: [facility]     Diagnosis:     ICD-10-CM    1. Chronic pain of left thumb  M79.645 XR HAND LEFT (MIN 3 VIEWS)    G89.29 betamethasone acetate-betamethasone sodium phosphate (CELESTONE) injection 6 mg           Requested DME:  [facility] Splint- ($95.00) X 1 - left        Clinical Notes:     **Indicates non-covered items by insurance. Payment expected on date of service. Electronically signed by  Provider: Chiki Pedroza MD__Date: 2022                            [address] Tax ID # [id number]        Durable Medical Equipment and/or Orthotics Patient Consent     I understand that my physician has prescribed this medical supply as part of my treatment plan as a matter of Medical Necessity.  I understand that I have a choice in where I receive my prescribed orthopedic supplies and/or services.  I authorize [facility] to furnish this service/product and to provide my insurance carrier with any information requested in order to process for payment.  I instruct my insurance carrier to pay [facility] directly for these services/products.  I understand that my insurance carrier may deny payment for this supply because it is a non-covered item, deemed not medically necessary or considered experimental.   I understand that any cost not covered by my insurance carrier will be solely my financial responsibility.  I have received the Supplier Standards and have reviewed them.  I have received the prescribed item and have been fully instructed on the proper use of the above services/products.    ______ (Patient Initials) I understand that all DME items are non-returnable after being dispensed.  Items still in sealed packaging may be returned up to 14 days after purchasing. [address] will replace items that are defective.    ______ (Patient Initials) I understand that [facility] will not file a claim with my insurance carrier for this service/product and I am waiving my right to file a claim on my own for this service/product with my insurance company as this item is NON-COVERED (Denoted by the **) by my Insurance company/policy. ______ (Patient Initials) I understand that I am responsible to bring my equipment to the hospital for any surgery. ______________________________________________  ________________________  Patient / Sandra Loco            Thank you for considering [address] Your physician has prescribed specific medical equipment or devices for your home use. The following describes your rights and responsibilities as our customer. Right to Choose Providers: You have a choice regarding which company supplies your home medical equipment and devices, and to consult your physician in this decision. You may choose a medical supply store, a home medical equipment provider, or a specialist such as [facility]. [facility] will coordinate with your physician to provide the medical equipment or devices prescribed for your home use. Right to Service:  You have the right to considerate, respectful and nondiscriminatory care. You have the right to receive accurate and easily understood information about your health care. If you speak a foreign language, or don't understand the discussions, assistance will be provided to allow you to make informed health care decisions. You have the right to know your treatment options and to participate in decisions about your care, including the right to accept or refuse treatment.   You have the right to expect a reasonable response to your requests for treatment or service. You have the right to talk in confidence with health care providers and to have your health care information protected. You have the right to receive an explanation of your bill. You have the right to complain about the service or product you receive. Patient Responsibilities:  Please provide complete and accurate information about your health insurance benefits and make arrangements for the timely payment of your bill. [facility] will, if possible, assume responsibility for billing your insurance (Medicare, Medicaid and commercial) for the prescribed equipment or devices. If your policy does not cover the prescribed product, or only covers a portion of the bill, you are responsible for any remaining balance. Return and Exchange Policy:  [facility] will honor published  Warranties for products. [facility] will accept returns or exchanges within 14 days from the date of receipt, providin) the product must be in new condition; 2) receipt as required; and 3) used disposable and hygiene products may only be returned due to a defective product. Note: Refunds will be issued in a timely manner, please allow 4-6 weeks for processing. Complaint Procedures and DME Consumer Protection Resources:  [facility] values you as a customer, and is committed to resolving patient concerns. This commitment includes understanding and documenting your concerns, conducting a review of internal procedures, and providing you with an explanation and resolution to your concerns. Should you have any questions about our services or billing process, please contact our office at (practice phone number). If we are unable to resolve the concern, you have the right to direct comments to the office of Consumer Protection, in the [address] or the [facility] office, without fear of repercussion.     DMEPOS SUPPLIER STANDARDS    A supplier must be in compliance with all applicable Federal and [facility] and regulatory requirements. A supplier must provide complete and accurate information on the DMEPOS supplier application. Any changes to this information must be reported to the [facility] within 30 days. An authorized individual (one whose signature is binding) must sign the application for billing privileges. A supplier must fill orders from its own inventory, or must contract with other companies for the purchase of items necessary to fill the order. A supplier may not contract with any entity that is currently excluded from the Medicare program, any [facility] program, or from any other Federal procurement or Nonprocurement programs. A supplier must advise beneficiaries that they may rent or purchase inexpensive or routinely purchased durable medical equipment, and of the purchase option for capped rental equipment. A supplier must notify beneficiaries of warranty coverage and honor all warranties under applicable State Law, and repair or replace free of charge Medicare covered items that are under warranty. A supplier must maintain a physical facility on an appropriate site. A supplier must permit CMS, or its agents to conduct on-site inspections to ascertain the supplier's compliance with these standards. The supplier location must be accessible to beneficiaries during reasonable business hours, and must maintain a visible sign and posted hours of operation. A supplier must maintain a primary business telephone listed under the name of the business in a [employer] or a toll free number available through directory assistance. The exclusive use of a beeper, answering machine or cell phone is prohibited. A supplier must have comprehensive liability insurance in the amount of at least $300,000 that covers both the supplier's place of business and all customers and employees of the supplier.   If the supplier manufactures its own items, this insurance must also cover product liability and completed operations. A supplier must agree not to initiate telephone contact with beneficiaries, with a few exceptions allowed. This standard prohibits suppliers from calling beneficiaries in order to solicit new business. A supplier is responsible for delivery and must instruct beneficiaries on use of Medicare covered items, and maintain proof of delivery. A supplier must answer questions, and respond to complaints of the beneficiaries, and maintain documentation of such contacts. A supplier must maintain and replace at no charge or repair directly, or through a service contract with another company, Medicare covered items it has rented to beneficiaries. A supplier must accept returns of substandard (less than full quality for the particular item) or unsuitable items (inappropriate for the beneficiary at the time it was fitted and rented or sold) from beneficiaries. A supplier must disclose these supplier standards to each beneficiary to whom it supplies a Medicare-covered item. A supplier must disclose to the government any person having ownership, financial, or control interest in the supplier. A supplier must not convey or reassign a supplier number; i.e., the supplier may not sell or allow another entity to use its Medicare billing number. A supplier must have a complaint resolution protocol established to address beneficiary complaints that relate to these standards. A record of these complaints must be maintained at the physical facility. Complaint records must include: the name, address, telephone number and health insurance claim number of the beneficiary, a summary of the complaint, and any action taken to resolve it. A supplier must agree to furnish CMS any information required by the Medicare statute and implementing regulations.   A supplier of DMEPOS and other items and services must be accredited by a CMS-approved accreditation organization in order to receive and retain a supplier billing number. The accreditation must indicate the specific products and services, for which the supplier is accredited in order for the supplier to receive payment for those specific products and services. A DMEPOS supplier must notify their accreditation organization when a new DMEPOS location is opened. The accreditation organization may accredit the new supplier location for three months after it is operational without requiring a new site visit. All DMEPOS supplier locations, whether owned or subcontracted, must meet the [employer] and be separately accredited in order to bill Medicare. An accredited supplier may be denied enrollment or their enrollment may be revoked, if CMS determines that they are not in compliance with the DMEPOS quality standards. A DMEPOS supplier must disclose upon enrollment all products and services, including the addition of new product lines for which they are seeking accreditation. If a new product line is added after enrollment, the DMEPOS supplier will be responsible for notifying the accrediting body of the new product so that the DMEPOS supplier can be re-surveyed and accredited for these new products. Must meet the surety bond requirements specified in 42 C. F.R. 424.57(c). Implementation date- May 4, 2009. A supplier must obtain oxygen from a state-licensed oxygen supplier. A supplier must maintain ordering and referring documentation consistent with provisions found in 42 C. F.R. 424.516(f). DMEPOS suppliers are prohibited from sharing a practice location with certain other Medicare providers and suppliers. DMEPOS suppliers must remain open to the public for a minimum of 30 hours per week with certain exceptions.

## 2022-12-14 NOTE — TELEPHONE ENCOUNTER
Called pt back and advised medication will be sent in by end of the day when Dr. Betsy Najera has finished dictating her chart.

## 2022-12-15 NOTE — PROGRESS NOTES
Orthopaedic Hand Surgery Note    Name: Mary Wilkerson  Age: 52 y.o. YOB: 1975  Gender: female  MRN: [id number]    CC: New patient referred for hand pain    HPI: Patient is a 52 y.o. female  right hand dominant with a chief complaint of left thumb pain and weakness. The patient complains of increased pain with activities involving pinch and  (ex/ opening jars, turning car key, buttons). The symptoms have been present for years, she is a previous patient of Dr. Nehal Kim who had right thumb trapeziectomy and suspension arthroplasty as well as right carpal tunnel release, she did relatively well with that. Evaluation to date has included Dr Nehal Kim. Treatment to date has included braces and injections. The current pain is rated a 7/10, alleviated by rest and interferes with daily activities. ROS/Meds/PSH/PMH/FH/SH: I personally reviewed the patients standard intake form. Pertinents are discussed in the HPI    Physical Examination:  General: Awake and alert. HEENT: Normocephalic, atraumatic  CV/Pulm: Breathing even and unlabored  Skin: No obvious rashes noted. Lymphatic: No obvious evidence of lymphedema or lymphadenopathy    Musculoskeletal:   Examination of the left upper extremity demonstrates normal sensation in median, ulnar and radial distribution, cap refill in all fingers < 5 seconds, positive carpal tunnel compression and Phalen test.  Mild prominence and instability of the base of first metacarpal. CMC grind is positive for pain and crepitus. Thumb MCP joint hyperextends 10 degrees. No pain at the radial styloid. Imaging / Electrodiagnostic Tests:     left Hand XR: AP, Lateral, Oblique and Thumb CMC joint     Clinical Indication:  1. Arthritis of carpometacarpal (CMC) joint of left thumb    2.  Left carpal tunnel syndrome           Report: AP, lateral, oblique and thumb CMC joint x-ray demonstrates joint space narrowing, subluxation and osteophytic changes of the trapezium consistent with osteoarthritis of the thumb CMC joint    Impression: Thumb CMC joint osteoarthritis as noted above     Lorene Tyson MD         Assessment:   1. Arthritis of carpometacarpal (CMC) joint of left thumb    2. Left carpal tunnel syndrome        Plan:  We discussed the diagnosis and different treatment options. We discussed observation, day/night splinting, cortisone injection(s) and surgical reconstruction with trapeziectomy and suspension arthroplasty and the risks and benefits of all were clearly outlined. We discussed the fact that the vast majority of thumb CMC arthritis causes persistent chronic pain and that surgical reconstruction is the endpoint for patients whose symptoms are not properly alleviated with conservative measures but the vast majority of patients end up having surgery. After discussing all options in detail the patient elects to proceed with left thumb CMC joint steroid injection, tramadol 50 mg 3 times daily as needed for pain. Her carpal tunnel symptoms are mild at this point, this is something we will follow, if she decides to have surgery in the future we will likely perform carpal tunnel release surgery at the same time. Procedure Note    The risk, benefits and alternatives of injection and no injection therapy were discussed. The patient consented for an injection. Time out performed. The patient has been identified by name and birthdate. The injection site was identified, marked and prepped with a alcohol swab. The Left thumb CMC joint was injected with 0.5ml of 6mg/ml Celestone and 0.5ml of Lidocaine plain 1%. The injection site was then dressed with a bandaid. The patient tolerated the injection well. The patient was instructed to monitor their blood sugars if diabetic and call if any concerns. The patient was instructed to call the office if any adverse local effects occurred or any if any questions or concerns arise.        Patient voiced accordance and understanding of the information provided and the formulated plan. All questions were answered to the patient's satisfaction during the encounter.     Lorre Brittle, MD  Orthopaedic Surgery  12/14/22  9:20 PM

## 2023-06-30 ENCOUNTER — TELEPHONE (OUTPATIENT)
Dept: ORTHOPEDIC SURGERY | Age: 48
End: 2023-06-30

## 2023-06-30 DIAGNOSIS — M17.12 UNILATERAL PRIMARY OSTEOARTHRITIS, LEFT KNEE: Primary | ICD-10-CM

## 2023-08-09 ENCOUNTER — OFFICE VISIT (OUTPATIENT)
Dept: ORTHOPEDIC SURGERY | Age: 48
End: 2023-08-09
Payer: MEDICARE

## 2023-08-09 DIAGNOSIS — M17.12 UNILATERAL PRIMARY OSTEOARTHRITIS, LEFT KNEE: Primary | ICD-10-CM

## 2023-08-09 PROCEDURE — 20611 DRAIN/INJ JOINT/BURSA W/US: CPT | Performed by: PHYSICIAN ASSISTANT

## 2023-08-09 RX ORDER — HYALURONATE SODIUM 10 MG/ML
20 SYRINGE (ML) INTRAARTICULAR ONCE
Status: COMPLETED | OUTPATIENT
Start: 2023-08-09 | End: 2023-08-09

## 2023-08-09 RX ADMIN — Medication 20 MG: at 14:23

## 2023-08-09 NOTE — PROGRESS NOTES
Name: Nguyễn Quiles  YOB: 1975  Gender: female  MRN: [id number]     CC: LEFT Knee Osteoarthritis      PROCEDURE: #1 of 3 Hyaluronic Injection    After discussion of risks and benefits including but not limited to pain, infection, skin discoloration, and injury to blood vessels or nerves the patient verbally consented to proceed with injection of the LEFT. The patient is to restrict their activity for 48 hours. Radiology Report: US guidance was used to examine the joint, ensure adequate needle placement and to decrease the risk of joint aggravation. The intracondylar notch, retropatellar fat pad, patella tendon, patella and tibia were all visualized. Pre and post injection US still images were obtained and placed in the record. Image were obtained with a [employer] ultrasound transducer (model [id number]). Procedure Note: After steriley prepping the LEFT knee, it was injected with a 2mL of Euflexxa and the medication was observed going into the intra-articular space via US guidance. The patient tolerated the procedure without difficulty.     PAULETTE Cortez

## 2023-08-16 ENCOUNTER — OFFICE VISIT (OUTPATIENT)
Dept: ORTHOPEDIC SURGERY | Age: 48
End: 2023-08-16

## 2023-08-16 DIAGNOSIS — M17.12 UNILATERAL PRIMARY OSTEOARTHRITIS, LEFT KNEE: Primary | ICD-10-CM

## 2023-08-16 DIAGNOSIS — M17.12 PRIMARY OSTEOARTHRITIS OF LEFT KNEE: ICD-10-CM

## 2023-08-16 RX ORDER — HYALURONATE SODIUM 10 MG/ML
20 SYRINGE (ML) INTRAARTICULAR ONCE
Status: COMPLETED | OUTPATIENT
Start: 2023-08-16 | End: 2023-08-16

## 2023-08-16 RX ADMIN — Medication 20 MG: at 15:45

## 2023-08-16 NOTE — PROGRESS NOTES
Name: Astrid Gomez  YOB: 1975  Gender: female  MRN: [id number]     CC: LEFT Knee Osteoarthritis      PROCEDURE: #2 of 3 Hyaluronic Injection    After discussion of risks and benefits including but not limited to pain, infection, skin discoloration, and injury to blood vessels or nerves the patient verbally consented to proceed with injection of the LEFT. The patient is to restrict their activity for 48 hours. Radiology Report: US guidance was used to examine the joint, ensure adequate needle placement and to decrease the risk of joint aggravation. The intracondylar notch, retropatellar fat pad, patella tendon, patella and tibia were all visualized. Pre and post injection US still images were obtained and placed in the record. Image were obtained with a [employer] ultrasound transducer (model [id number]). Procedure Note: After steriley prepping the LEFT knee, it was injected with a 2mL of Euflexxa and the medication was observed going into the intra-articular space via US guidance. The patient tolerated the procedure without difficulty.     Luis F Harrison MD

## 2023-08-23 ENCOUNTER — OFFICE VISIT (OUTPATIENT)
Dept: ORTHOPEDIC SURGERY | Age: 48
End: 2023-08-23

## 2023-08-23 DIAGNOSIS — M17.12 UNILATERAL PRIMARY OSTEOARTHRITIS, LEFT KNEE: Primary | ICD-10-CM

## 2023-08-23 RX ORDER — HYALURONATE SODIUM 10 MG/ML
20 SYRINGE (ML) INTRAARTICULAR ONCE
Status: COMPLETED | OUTPATIENT
Start: 2023-08-23 | End: 2023-08-23

## 2023-08-23 RX ADMIN — Medication 20 MG: at 15:30

## 2023-08-23 NOTE — PROGRESS NOTES
Name: Anival Sue  YOB: 1975  Gender: female  MRN: [id number]     CC: LEFT Knee Osteoarthritis      PROCEDURE: #3 of 3 Hyaluronic Injection    After discussion of risks and benefits including but not limited to pain, infection, skin discoloration, and injury to blood vessels or nerves the patient verbally consented to proceed with injection of the LEFT. The patient is to restrict their activity for 48 hours. Radiology Report: US guidance was used to examine the joint, ensure adequate needle placement and to decrease the risk of joint aggravation. The intracondylar notch, retropatellar fat pad, patella tendon, patella and tibia were all visualized. Pre and post injection US still images were obtained and placed in the record. Image were obtained with a [employer] ultrasound transducer (model [id number]). Procedure Note: After steriley prepping the LEFT knee, it was injected with a 2mL of Euflexxa and the medication was observed going into the intra-articular space via US guidance. The patient tolerated the procedure without difficulty.     PAULETTE Self

## 2023-09-06 ENCOUNTER — OFFICE VISIT (OUTPATIENT)
Dept: ORTHOPEDIC SURGERY | Age: 48
End: 2023-09-06
Payer: MEDICARE

## 2023-09-06 DIAGNOSIS — M65.4 DE QUERVAIN'S TENOSYNOVITIS, LEFT: ICD-10-CM

## 2023-09-06 DIAGNOSIS — M18.12 ARTHRITIS OF CARPOMETACARPAL (CMC) JOINT OF LEFT THUMB: ICD-10-CM

## 2023-09-06 DIAGNOSIS — G56.02 LEFT CARPAL TUNNEL SYNDROME: Primary | ICD-10-CM

## 2023-09-06 PROCEDURE — 99214 OFFICE O/P EST MOD 30 MIN: CPT | Performed by: ORTHOPAEDIC SURGERY

## 2023-09-06 NOTE — PROGRESS NOTES
benefits of all were clearly outlined. We discussed the fact that the vast majority of thumb CMC arthritis causes persistent chronic pain and that surgical reconstruction is the endpoint for patients whose symptoms are not properly alleviated with conservative measures such as injection, NSAIDs and bracing, most patients obtaining good long-lasting relief with these measures but some patients end up having surgery. After discussing all options in detail the patient elects to proceed with left ultrasound-guided carpal tunnel release, left thumb trapeziectomy and suspension arthroplasty, left de Quervain's release. Patient understands risks and benefits of LEFT ULTRASOUND-GUIDED CARPAL TUNNEL RELEASE, LEFT THUMB TRAPEZIECTOMY AND SUSPENSION ARTHROPLASTY, LEFT DE QUERVAIN'S RELEASE including but not limited to nerve injury, vessel injury, infection, failure to achieve desired results and possible need for additional surgery. Patient understands and wishes to proceed with surgery. On Exam:   The patient is alert and oriented; ;   Lung auscultation is clear bilaterally   Heart has RRR without murmurs      Patient voiced accordance and understanding of the information provided and the formulated plan. All questions were answered to the patient's satisfaction during the encounter.     Guy Metzger MD  Orthopaedic Surgery  09/06/23  3:35 PM

## 2023-09-07 DIAGNOSIS — G56.02 LEFT CARPAL TUNNEL SYNDROME: ICD-10-CM

## 2023-09-07 DIAGNOSIS — M65.4 DE QUERVAIN'S TENOSYNOVITIS, LEFT: ICD-10-CM

## 2023-09-07 DIAGNOSIS — M18.12 ARTHRITIS OF CARPOMETACARPAL (CMC) JOINT OF LEFT THUMB: Primary | ICD-10-CM

## 2023-09-14 ENCOUNTER — TELEPHONE (OUTPATIENT)
Dept: ORTHOPEDIC SURGERY | Age: 48
End: 2023-09-14

## 2023-09-14 NOTE — TELEPHONE ENCOUNTER
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 She would also like this letter to have her maiden name on it of Ty as that is the name she flies under. Please call her back at [phone]    Was an appointment offered for this call? No    Preferred method for responding to this message: Telephone Call    If we cannot reach you directly, may we leave a detailed response at the number you provided? Yes    Can this message wait until your PCP/provider returns, if available today? Not applicable    Brandee Lopez     Lvm to call back and confirm she does want to [facility] and what date other date she would like

## 2023-09-14 NOTE — TELEPHONE ENCOUNTER
----- Message from Beatriz Juárez sent at 9/14/2023  2:45 PM EDT -----  Regarding: sx date-LORENZO  Called patient to verify OT appt. Patient states she will need to reschedule her surgery I told her to call the main line and I would send dr mouna a message.

## 2023-09-15 ENCOUNTER — TELEPHONE (OUTPATIENT)
Dept: ORTHOPEDIC SURGERY | Age: 48
End: 2023-09-15

## 2023-09-15 NOTE — TELEPHONE ENCOUNTER
----- Message from Panchito, [facility] sent at 9/14/2023  4:12 PM EDT -----  Check back. [facility] sx?

## 2024-12-26 ENCOUNTER — TELEPHONE (OUTPATIENT)
Dept: ORTHOPEDIC SURGERY | Age: 49
End: 2024-12-26

## 2024-12-27 NOTE — TELEPHONE ENCOUNTER
LVM with patient to call the office to make an appt since she was last seen 8/23/23.  
Patient wants to schedule more gel injects  
Instructions: This plan will send the code FBSD to the PM system.  DO NOT or CHANGE the price.
Detail Level: Simple
Price (Do Not Change): 0.00

## 2025-01-31 ENCOUNTER — TELEPHONE (OUTPATIENT)
Dept: ORTHOPEDIC SURGERY | Age: 50
End: 2025-01-31

## 2025-01-31 NOTE — TELEPHONE ENCOUNTER
She left a voicemail  message that she has an upcoming appt with FAN. She is trying to complete the forms online an it will not allow her to. Can you please give her a call?

## (undated) DEVICE — SUTURE NONABSORBABLE MONOFILAMENT 4-0 PS-2 18 IN BLU PROLENE 8682H

## (undated) DEVICE — BANDAGE COMPR 9 FTX4 IN SMOOTH COMFORTABLE SYNTH ESMRK LF

## (undated) DEVICE — DRAPE XR C ARM 41X74IN LF --

## (undated) DEVICE — BLADE OPHTH 180DEG CUT SURF BLU STR SHRP DBL BVL GRINDLESS

## (undated) DEVICE — SYRINGE EAR 2OZ ULC SLIMMER TIP FLAT BTM SUCT PWR DISP FOR

## (undated) DEVICE — APPLICATOR BNDG 1MM ADH PREMIERPRO EXOFIN

## (undated) DEVICE — DRESSING,GAUZE,XEROFORM,CURAD,1"X8",ST: Brand: CURAD

## (undated) DEVICE — SURGICAL PROCEDURE PACK BASIC ST FRANCIS

## (undated) DEVICE — PADDING CAST W2INXL4YD ST COT COHESIVE HND TEARABLE SPEC

## (undated) DEVICE — STERILE HOOK LOCK LATEX FREE ELASTIC BANDAGE 3INX5YD: Brand: HOOK LOCK™

## (undated) DEVICE — BNDG ESMARK LATEX 4X12 STRL -- MEDICHOICE

## (undated) DEVICE — SUT ETHLN 2 30IN LR DA BLK --

## (undated) DEVICE — SUT PROL 3-0 18IN PS2 BLU --

## (undated) DEVICE — PRECISION THIN, OFFSET (5.5 X 0.38 X 25.0MM)

## (undated) DEVICE — ZIMMER® STERILE DISPOSABLE TOURNIQUET CUFF WITH PLC, DUAL PORT, SINGLE BLADDER, 18 IN. (46 CM)

## (undated) DEVICE — CAP PROTCT GRN REFIL FOR 0.054-0.062IN WIRE W SER PIN BALL

## (undated) DEVICE — COVER LT HNDL FOR OR SURG LAMP

## (undated) DEVICE — (D)PREP SKN CHLRAPRP APPL 26ML -- CONVERT TO ITEM 371833

## (undated) DEVICE — PADDING CAST COHESIVE 4 YDX3 IN HND TEARABLE COTTON SPEC 100

## (undated) DEVICE — DRAPE,HAND,STERILE: Brand: MEDLINE

## (undated) DEVICE — SOLUTION IV 1000ML 0.9% SOD CHL

## (undated) DEVICE — SUTURE ETHBND EXCEL SZ 3-0 L30IN NONABSORBABLE GRN L26MM SH X832H

## (undated) DEVICE — STERILE HOOK LOCK LATEX FREE ELASTIC BANDAGE 2INX5YD: Brand: HOOK LOCK™

## (undated) DEVICE — RETRIEVER SUT ARTHSCP HOFFEE --

## (undated) DEVICE — SUTURE MCRYL SZ 3-0 L27IN ABSRB UD L19MM PS-2 3/8 CIR PRIM Y427H

## (undated) DEVICE — DISPOSABLE BIPOLAR CODE, 12' (3.66 M): Brand: CONMED

## (undated) DEVICE — DRAPE, FILM SHEET, 44X65 STERILE: Brand: MEDLINE

## (undated) DEVICE — AMD ANTIMICROBIAL GAUZE SPONGES,12 PLY USP TYPE VII, 0.2% POLYHEXAMETHYLENE BIGUANIDE HCI (PHMB): Brand: CURITY

## (undated) DEVICE — SLING ARM LG 2-37INX9-20IN PCH --

## (undated) DEVICE — SUTURE PROL SZ 4-0 L18IN NONABSORBABLE BLU L13MM P-3 3/8 8699G

## (undated) DEVICE — TRAP TRAC M FNGR MESH SGL COLOR-CODED MX MTCH DISP

## (undated) DEVICE — BUTTON SWITCH PENCIL BLADE ELECTRODE, HOLSTER: Brand: EDGE